# Patient Record
Sex: FEMALE | Race: WHITE | Employment: FULL TIME | URBAN - METROPOLITAN AREA
[De-identification: names, ages, dates, MRNs, and addresses within clinical notes are randomized per-mention and may not be internally consistent; named-entity substitution may affect disease eponyms.]

---

## 2018-01-20 PROBLEM — K61.2 ABSCESS OF ANAL OR RECTAL REGION: Status: ACTIVE | Noted: 2018-01-20

## 2018-02-16 PROBLEM — N20.1 URETEROLITHIASIS: Status: ACTIVE | Noted: 2018-02-16

## 2018-04-06 ENCOUNTER — HOSPITAL ENCOUNTER (OUTPATIENT)
Age: 63
Discharge: HOME | End: 2018-04-08
Payer: COMMERCIAL

## 2018-04-06 PROCEDURE — 88305 TISSUE EXAM BY PATHOLOGIST: CPT

## 2018-04-13 ENCOUNTER — HOSPITAL ENCOUNTER (OUTPATIENT)
Age: 63
Discharge: HOME | End: 2018-04-15
Payer: COMMERCIAL

## 2018-04-13 ENCOUNTER — HOSPITAL ENCOUNTER (OUTPATIENT)
Dept: GENERAL RADIOLOGY | Age: 63
Discharge: HOME | End: 2018-04-15
Payer: COMMERCIAL

## 2018-04-13 DIAGNOSIS — Z01.811 PRE-OP CHEST EXAM: ICD-10-CM

## 2018-04-13 PROCEDURE — 71046 X-RAY EXAM CHEST 2 VIEWS: CPT

## 2018-04-14 ENCOUNTER — HOSPITAL ENCOUNTER (OUTPATIENT)
Age: 63
Discharge: HOME | End: 2018-04-14
Payer: COMMERCIAL

## 2018-04-14 LAB
ALBUMIN SERPL-MCNC: 3.6 G/DL (ref 3.5–5.2)
ALP BLD-CCNC: 79 U/L (ref 35–104)
ALT SERPL-CCNC: 12 U/L (ref 0–32)
ANION GAP SERPL CALCULATED.3IONS-SCNC: 12 MMOL/L (ref 7–16)
AST SERPL-CCNC: 13 U/L (ref 0–31)
BILIRUB SERPL-MCNC: 0.4 MG/DL (ref 0–1.2)
BUN BLDV-MCNC: 15 MG/DL (ref 8–23)
CALCIUM SERPL-MCNC: 10.5 MG/DL (ref 8.6–10.2)
CHLORIDE BLD-SCNC: 101 MMOL/L (ref 98–107)
CHOLESTEROL, TOTAL: 146 MG/DL (ref 0–199)
CO2: 25 MMOL/L (ref 22–29)
CREAT SERPL-MCNC: 1.1 MG/DL (ref 0.5–1)
FERRITIN: 34 NG/ML
GFR AFRICAN AMERICAN: >60
GFR NON-AFRICAN AMERICAN: 50 ML/MIN/1.73
GLUCOSE BLD-MCNC: 119 MG/DL (ref 74–109)
HBA1C MFR BLD: 6 % (ref 4.8–5.9)
HCT VFR BLD CALC: 33.5 % (ref 34–48)
HDLC SERPL-MCNC: 43 MG/DL
HEMOGLOBIN: 10.3 G/DL (ref 11.5–15.5)
IRON SATURATION: 19 % (ref 15–50)
IRON: 50 MCG/DL (ref 37–145)
LDL CHOLESTEROL CALCULATED: 63 MG/DL (ref 0–99)
MCH RBC QN AUTO: 27.2 PG (ref 26–35)
MCHC RBC AUTO-ENTMCNC: 30.7 % (ref 32–34.5)
MCV RBC AUTO: 88.6 FL (ref 80–99.9)
PDW BLD-RTO: 15.2 FL (ref 11.5–15)
PLATELET # BLD: 377 E9/L (ref 130–450)
PMV BLD AUTO: 11.4 FL (ref 7–12)
POTASSIUM SERPL-SCNC: 4.1 MMOL/L (ref 3.5–5)
RBC # BLD: 3.78 E12/L (ref 3.5–5.5)
SODIUM BLD-SCNC: 138 MMOL/L (ref 132–146)
TOTAL IRON BINDING CAPACITY: 258 MCG/DL (ref 250–450)
TOTAL PROTEIN: 7.6 G/DL (ref 6.4–8.3)
TRIGL SERPL-MCNC: 201 MG/DL (ref 0–149)
TSH SERPL DL<=0.05 MIU/L-ACNC: 2.5 UIU/ML (ref 0.27–4.2)
VLDLC SERPL CALC-MCNC: 40 MG/DL
WBC # BLD: 8.4 E9/L (ref 4.5–11.5)

## 2018-04-14 PROCEDURE — 80061 LIPID PANEL: CPT

## 2018-04-14 PROCEDURE — 82728 ASSAY OF FERRITIN: CPT

## 2018-04-14 PROCEDURE — 83036 HEMOGLOBIN GLYCOSYLATED A1C: CPT

## 2018-04-14 PROCEDURE — 83550 IRON BINDING TEST: CPT

## 2018-04-14 PROCEDURE — 36415 COLL VENOUS BLD VENIPUNCTURE: CPT

## 2018-04-14 PROCEDURE — 80053 COMPREHEN METABOLIC PANEL: CPT

## 2018-04-14 PROCEDURE — 83540 ASSAY OF IRON: CPT

## 2018-04-14 PROCEDURE — 85027 COMPLETE CBC AUTOMATED: CPT

## 2018-04-14 PROCEDURE — 87340 HEPATITIS B SURFACE AG IA: CPT

## 2018-04-14 PROCEDURE — 84443 ASSAY THYROID STIM HORMONE: CPT

## 2018-04-14 PROCEDURE — 86706 HEP B SURFACE ANTIBODY: CPT

## 2018-04-16 LAB
HBV SURFACE AB TITR SER: REACTIVE {TITER}
HEPATITIS B SURFACE ANTIGEN INTERPRETATION: NORMAL

## 2018-08-24 ENCOUNTER — HOSPITAL ENCOUNTER (OUTPATIENT)
Age: 63
Discharge: HOME | End: 2018-08-24
Payer: COMMERCIAL

## 2018-08-24 LAB
ALBUMIN SERPL-MCNC: 3.5 G/DL (ref 3.5–5.2)
ALP BLD-CCNC: 79 U/L (ref 35–104)
ALT SERPL-CCNC: 13 U/L (ref 0–32)
ANION GAP SERPL CALCULATED.3IONS-SCNC: 9 MMOL/L (ref 7–16)
AST SERPL-CCNC: 25 U/L (ref 0–31)
BASOPHILS ABSOLUTE: 0.03 E9/L (ref 0–0.2)
BASOPHILS RELATIVE PERCENT: 0.4 % (ref 0–2)
BILIRUB SERPL-MCNC: 0.3 MG/DL (ref 0–1.2)
BUN BLDV-MCNC: 13 MG/DL (ref 8–23)
C-REACTIVE PROTEIN: 3.2 MG/DL (ref 0–0.4)
CALCIUM SERPL-MCNC: 10.6 MG/DL (ref 8.6–10.2)
CHLORIDE BLD-SCNC: 102 MMOL/L (ref 98–107)
CO2: 28 MMOL/L (ref 22–29)
CREAT SERPL-MCNC: 1.1 MG/DL (ref 0.5–1)
EOSINOPHILS ABSOLUTE: 0.32 E9/L (ref 0.05–0.5)
EOSINOPHILS RELATIVE PERCENT: 4.2 % (ref 0–6)
GFR AFRICAN AMERICAN: >60
GFR NON-AFRICAN AMERICAN: 50 ML/MIN/1.73
GLUCOSE BLD-MCNC: 109 MG/DL (ref 74–109)
HCT VFR BLD CALC: 31.4 % (ref 34–48)
HEMOGLOBIN: 9.5 G/DL (ref 11.5–15.5)
IMMATURE GRANULOCYTES #: 0.03 E9/L
IMMATURE GRANULOCYTES %: 0.4 % (ref 0–5)
LYMPHOCYTES ABSOLUTE: 1.84 E9/L (ref 1.5–4)
LYMPHOCYTES RELATIVE PERCENT: 24 % (ref 20–42)
MCH RBC QN AUTO: 26 PG (ref 26–35)
MCHC RBC AUTO-ENTMCNC: 30.3 % (ref 32–34.5)
MCV RBC AUTO: 86 FL (ref 80–99.9)
MONOCYTES ABSOLUTE: 0.41 E9/L (ref 0.1–0.95)
MONOCYTES RELATIVE PERCENT: 5.3 % (ref 2–12)
NEUTROPHILS ABSOLUTE: 5.05 E9/L (ref 1.8–7.3)
NEUTROPHILS RELATIVE PERCENT: 65.7 % (ref 43–80)
PDW BLD-RTO: 15.4 FL (ref 11.5–15)
PLATELET # BLD: 407 E9/L (ref 130–450)
PMV BLD AUTO: 10 FL (ref 7–12)
POTASSIUM SERPL-SCNC: 5.1 MMOL/L (ref 3.5–5)
RBC # BLD: 3.65 E12/L (ref 3.5–5.5)
SODIUM BLD-SCNC: 139 MMOL/L (ref 132–146)
TOTAL PROTEIN: 7.9 G/DL (ref 6.4–8.3)
VITAMIN B-12: 371 PG/ML (ref 211–946)
VITAMIN D 25-HYDROXY: 25 NG/ML (ref 30–100)
WBC # BLD: 7.7 E9/L (ref 4.5–11.5)

## 2018-08-24 PROCEDURE — 80053 COMPREHEN METABOLIC PANEL: CPT

## 2018-08-24 PROCEDURE — 82607 VITAMIN B-12: CPT

## 2018-08-24 PROCEDURE — 36415 COLL VENOUS BLD VENIPUNCTURE: CPT

## 2018-08-24 PROCEDURE — 85025 COMPLETE CBC W/AUTO DIFF WBC: CPT

## 2018-08-24 PROCEDURE — 80299 QUANTITATIVE ASSAY DRUG: CPT

## 2018-08-24 PROCEDURE — 83993 ASSAY FOR CALPROTECTIN FECAL: CPT

## 2018-08-24 PROCEDURE — 86140 C-REACTIVE PROTEIN: CPT

## 2018-08-24 PROCEDURE — 82306 VITAMIN D 25 HYDROXY: CPT

## 2018-09-02 LAB
Lab: NORMAL
Lab: NORMAL
REPORT: NORMAL
REPORT: NORMAL
THIS TEST SENT TO: NORMAL
THIS TEST SENT TO: NORMAL

## 2018-12-08 ENCOUNTER — HOSPITAL ENCOUNTER (OUTPATIENT)
Age: 63
Discharge: HOME | End: 2018-12-08
Payer: COMMERCIAL

## 2018-12-08 LAB
ALBUMIN SERPL-MCNC: 3.5 G/DL (ref 3.5–5.2)
ALP BLD-CCNC: 76 U/L (ref 35–104)
ALT SERPL-CCNC: 9 U/L (ref 0–32)
AST SERPL-CCNC: 12 U/L (ref 0–31)
BILIRUB SERPL-MCNC: 0.4 MG/DL (ref 0–1.2)
BILIRUBIN DIRECT: <0.2 MG/DL (ref 0–0.3)
BILIRUBIN, INDIRECT: NORMAL MG/DL (ref 0–1)
HCT VFR BLD CALC: 30.1 % (ref 34–48)
HEMOGLOBIN: 9.5 G/DL (ref 11.5–15.5)
MCH RBC QN AUTO: 27.3 PG (ref 26–35)
MCHC RBC AUTO-ENTMCNC: 31.6 % (ref 32–34.5)
MCV RBC AUTO: 86.5 FL (ref 80–99.9)
PDW BLD-RTO: 14.5 FL (ref 11.5–15)
PLATELET # BLD: 386 E9/L (ref 130–450)
PMV BLD AUTO: 9.3 FL (ref 7–12)
RBC # BLD: 3.48 E12/L (ref 3.5–5.5)
SEDIMENTATION RATE, ERYTHROCYTE: 109 MM/HR (ref 0–20)
TOTAL PROTEIN: 7.7 G/DL (ref 6.4–8.3)
WBC # BLD: 9.2 E9/L (ref 4.5–11.5)

## 2018-12-08 PROCEDURE — 80076 HEPATIC FUNCTION PANEL: CPT

## 2018-12-08 PROCEDURE — 85027 COMPLETE CBC AUTOMATED: CPT

## 2018-12-08 PROCEDURE — 36415 COLL VENOUS BLD VENIPUNCTURE: CPT

## 2018-12-08 PROCEDURE — 85651 RBC SED RATE NONAUTOMATED: CPT

## 2018-12-08 PROCEDURE — 86140 C-REACTIVE PROTEIN: CPT

## 2018-12-09 LAB — C-REACTIVE PROTEIN: 4.4 MG/DL (ref 0–0.4)

## 2019-02-09 ENCOUNTER — HOSPITAL ENCOUNTER (OUTPATIENT)
Age: 64
Discharge: HOME | End: 2019-02-09
Payer: COMMERCIAL

## 2019-02-09 LAB
ALBUMIN SERPL-MCNC: 3.4 G/DL (ref 3.5–5.2)
ALP BLD-CCNC: 81 U/L (ref 35–104)
ALT SERPL-CCNC: 7 U/L (ref 0–32)
ANION GAP SERPL CALCULATED.3IONS-SCNC: 10 MMOL/L (ref 7–16)
AST SERPL-CCNC: 11 U/L (ref 0–31)
BASOPHILS ABSOLUTE: 0.03 E9/L (ref 0–0.2)
BASOPHILS RELATIVE PERCENT: 0.4 % (ref 0–2)
BILIRUB SERPL-MCNC: 0.5 MG/DL (ref 0–1.2)
BUN BLDV-MCNC: 12 MG/DL (ref 8–23)
C-REACTIVE PROTEIN: 4 MG/DL (ref 0–0.4)
CALCIUM SERPL-MCNC: 10.6 MG/DL (ref 8.6–10.2)
CHLORIDE BLD-SCNC: 102 MMOL/L (ref 98–107)
CO2: 29 MMOL/L (ref 22–29)
CREAT SERPL-MCNC: 1.2 MG/DL (ref 0.5–1)
EOSINOPHILS ABSOLUTE: 0.16 E9/L (ref 0.05–0.5)
EOSINOPHILS RELATIVE PERCENT: 1.9 % (ref 0–6)
GFR AFRICAN AMERICAN: 55
GFR NON-AFRICAN AMERICAN: 45 ML/MIN/1.73
GLUCOSE BLD-MCNC: 117 MG/DL (ref 74–99)
HCT VFR BLD CALC: 32.3 % (ref 34–48)
HEMOGLOBIN: 9.9 G/DL (ref 11.5–15.5)
IMMATURE GRANULOCYTES #: 0.05 E9/L
IMMATURE GRANULOCYTES %: 0.6 % (ref 0–5)
LYMPHOCYTES ABSOLUTE: 1.34 E9/L (ref 1.5–4)
LYMPHOCYTES RELATIVE PERCENT: 15.9 % (ref 20–42)
MCH RBC QN AUTO: 27.1 PG (ref 26–35)
MCHC RBC AUTO-ENTMCNC: 30.7 % (ref 32–34.5)
MCV RBC AUTO: 88.5 FL (ref 80–99.9)
MONOCYTES ABSOLUTE: 0.44 E9/L (ref 0.1–0.95)
MONOCYTES RELATIVE PERCENT: 5.2 % (ref 2–12)
NEUTROPHILS ABSOLUTE: 6.42 E9/L (ref 1.8–7.3)
NEUTROPHILS RELATIVE PERCENT: 76 % (ref 43–80)
PDW BLD-RTO: 14.7 FL (ref 11.5–15)
PLATELET # BLD: 496 E9/L (ref 130–450)
PMV BLD AUTO: 9.8 FL (ref 7–12)
POTASSIUM SERPL-SCNC: 4 MMOL/L (ref 3.5–5)
RBC # BLD: 3.65 E12/L (ref 3.5–5.5)
SODIUM BLD-SCNC: 141 MMOL/L (ref 132–146)
TOTAL PROTEIN: 7.4 G/DL (ref 6.4–8.3)
WBC # BLD: 8.4 E9/L (ref 4.5–11.5)

## 2019-02-09 PROCEDURE — 36415 COLL VENOUS BLD VENIPUNCTURE: CPT

## 2019-02-09 PROCEDURE — 85025 COMPLETE CBC W/AUTO DIFF WBC: CPT

## 2019-02-09 PROCEDURE — 80053 COMPREHEN METABOLIC PANEL: CPT

## 2019-02-09 PROCEDURE — 86140 C-REACTIVE PROTEIN: CPT

## 2019-02-11 ENCOUNTER — PREP FOR PROCEDURE (OUTPATIENT)
Dept: SURGERY | Age: 64
End: 2019-02-11

## 2019-02-11 RX ORDER — SODIUM CHLORIDE, SODIUM LACTATE, POTASSIUM CHLORIDE, CALCIUM CHLORIDE 600; 310; 30; 20 MG/100ML; MG/100ML; MG/100ML; MG/100ML
INJECTION, SOLUTION INTRAVENOUS CONTINUOUS
Status: CANCELLED | OUTPATIENT
Start: 2019-02-11

## 2019-02-11 RX ORDER — SODIUM CHLORIDE 0.9 % (FLUSH) 0.9 %
10 SYRINGE (ML) INJECTION EVERY 12 HOURS SCHEDULED
Status: CANCELLED | OUTPATIENT
Start: 2019-02-11

## 2019-02-12 ENCOUNTER — PREP FOR PROCEDURE (OUTPATIENT)
Dept: SURGERY | Age: 64
End: 2019-02-12

## 2019-02-13 ENCOUNTER — HOSPITAL ENCOUNTER (OUTPATIENT)
Age: 64
Setting detail: OUTPATIENT SURGERY
Discharge: HOME | End: 2019-02-13
Attending: SURGERY | Admitting: SURGERY
Payer: COMMERCIAL

## 2019-02-13 ENCOUNTER — ANESTHESIA (OUTPATIENT)
Dept: OPERATING ROOM | Age: 64
End: 2019-02-13
Payer: COMMERCIAL

## 2019-02-13 ENCOUNTER — ANESTHESIA EVENT (OUTPATIENT)
Dept: OPERATING ROOM | Age: 64
End: 2019-02-13
Payer: COMMERCIAL

## 2019-02-13 VITALS
DIASTOLIC BLOOD PRESSURE: 57 MMHG | HEART RATE: 70 BPM | OXYGEN SATURATION: 97 % | BODY MASS INDEX: 33.13 KG/M2 | SYSTOLIC BLOOD PRESSURE: 111 MMHG | RESPIRATION RATE: 16 BRPM | WEIGHT: 180 LBS | TEMPERATURE: 97.5 F | HEIGHT: 62 IN

## 2019-02-13 VITALS — OXYGEN SATURATION: 99 % | DIASTOLIC BLOOD PRESSURE: 52 MMHG | TEMPERATURE: 67.6 F | SYSTOLIC BLOOD PRESSURE: 90 MMHG

## 2019-02-13 DIAGNOSIS — K61.2 ABSCESS OF ANAL OR RECTAL REGION: Primary | ICD-10-CM

## 2019-02-13 LAB — METER GLUCOSE: 116 MG/DL (ref 74–99)

## 2019-02-13 PROCEDURE — 3700000000 HC ANESTHESIA ATTENDED CARE: Performed by: SURGERY

## 2019-02-13 PROCEDURE — 2500000003 HC RX 250 WO HCPCS: Performed by: SURGERY

## 2019-02-13 PROCEDURE — 2709999900 HC NON-CHARGEABLE SUPPLY: Performed by: SURGERY

## 2019-02-13 PROCEDURE — 3700000001 HC ADD 15 MINUTES (ANESTHESIA): Performed by: SURGERY

## 2019-02-13 PROCEDURE — 6360000002 HC RX W HCPCS: Performed by: NURSE ANESTHETIST, CERTIFIED REGISTERED

## 2019-02-13 PROCEDURE — 7100000010 HC PHASE II RECOVERY - FIRST 15 MIN: Performed by: SURGERY

## 2019-02-13 PROCEDURE — 2500000003 HC RX 250 WO HCPCS: Performed by: NURSE ANESTHETIST, CERTIFIED REGISTERED

## 2019-02-13 PROCEDURE — 7100000011 HC PHASE II RECOVERY - ADDTL 15 MIN: Performed by: SURGERY

## 2019-02-13 PROCEDURE — 3600000013 HC SURGERY LEVEL 3 ADDTL 15MIN: Performed by: SURGERY

## 2019-02-13 PROCEDURE — 3600000003 HC SURGERY LEVEL 3 BASE: Performed by: SURGERY

## 2019-02-13 PROCEDURE — 2580000003 HC RX 258: Performed by: SURGERY

## 2019-02-13 PROCEDURE — 82962 GLUCOSE BLOOD TEST: CPT

## 2019-02-13 PROCEDURE — 2580000003 HC RX 258: Performed by: NURSE ANESTHETIST, CERTIFIED REGISTERED

## 2019-02-13 RX ORDER — MEPERIDINE HYDROCHLORIDE 25 MG/ML
12.5 INJECTION INTRAMUSCULAR; INTRAVENOUS; SUBCUTANEOUS EVERY 10 MIN PRN
Status: DISCONTINUED | OUTPATIENT
Start: 2019-02-13 | End: 2019-02-13 | Stop reason: HOSPADM

## 2019-02-13 RX ORDER — LIDOCAINE HYDROCHLORIDE 20 MG/ML
INJECTION, SOLUTION INFILTRATION; PERINEURAL PRN
Status: DISCONTINUED | OUTPATIENT
Start: 2019-02-13 | End: 2019-02-13 | Stop reason: SDUPTHER

## 2019-02-13 RX ORDER — KETOROLAC TROMETHAMINE 30 MG/ML
INJECTION, SOLUTION INTRAMUSCULAR; INTRAVENOUS PRN
Status: DISCONTINUED | OUTPATIENT
Start: 2019-02-13 | End: 2019-02-13 | Stop reason: SDUPTHER

## 2019-02-13 RX ORDER — LIDOCAINE HYDROCHLORIDE 10 MG/ML
INJECTION, SOLUTION INFILTRATION; PERINEURAL PRN
Status: DISCONTINUED | OUTPATIENT
Start: 2019-02-13 | End: 2019-02-13 | Stop reason: ALTCHOICE

## 2019-02-13 RX ORDER — OXYCODONE HYDROCHLORIDE AND ACETAMINOPHEN 5; 325 MG/1; MG/1
1 TABLET ORAL
Status: DISCONTINUED | OUTPATIENT
Start: 2019-02-13 | End: 2019-02-13 | Stop reason: HOSPADM

## 2019-02-13 RX ORDER — SODIUM CHLORIDE, SODIUM LACTATE, POTASSIUM CHLORIDE, CALCIUM CHLORIDE 600; 310; 30; 20 MG/100ML; MG/100ML; MG/100ML; MG/100ML
INJECTION, SOLUTION INTRAVENOUS CONTINUOUS PRN
Status: DISCONTINUED | OUTPATIENT
Start: 2019-02-13 | End: 2019-02-13 | Stop reason: SDUPTHER

## 2019-02-13 RX ORDER — FENTANYL CITRATE 50 UG/ML
50 INJECTION, SOLUTION INTRAMUSCULAR; INTRAVENOUS EVERY 5 MIN PRN
Status: DISCONTINUED | OUTPATIENT
Start: 2019-02-13 | End: 2019-02-13 | Stop reason: HOSPADM

## 2019-02-13 RX ORDER — HYDROCODONE BITARTRATE AND ACETAMINOPHEN 5; 325 MG/1; MG/1
1 TABLET ORAL EVERY 4 HOURS PRN
Qty: 30 TABLET | Refills: 0 | Status: SHIPPED | OUTPATIENT
Start: 2019-02-13 | End: 2019-02-18

## 2019-02-13 RX ORDER — FENTANYL CITRATE 50 UG/ML
INJECTION, SOLUTION INTRAMUSCULAR; INTRAVENOUS PRN
Status: DISCONTINUED | OUTPATIENT
Start: 2019-02-13 | End: 2019-02-13 | Stop reason: SDUPTHER

## 2019-02-13 RX ORDER — MIDAZOLAM HYDROCHLORIDE 1 MG/ML
INJECTION INTRAMUSCULAR; INTRAVENOUS PRN
Status: DISCONTINUED | OUTPATIENT
Start: 2019-02-13 | End: 2019-02-13 | Stop reason: SDUPTHER

## 2019-02-13 RX ORDER — SODIUM CHLORIDE, SODIUM LACTATE, POTASSIUM CHLORIDE, CALCIUM CHLORIDE 600; 310; 30; 20 MG/100ML; MG/100ML; MG/100ML; MG/100ML
INJECTION, SOLUTION INTRAVENOUS CONTINUOUS
Status: DISCONTINUED | OUTPATIENT
Start: 2019-02-13 | End: 2019-02-13 | Stop reason: HOSPADM

## 2019-02-13 RX ORDER — SODIUM CHLORIDE 0.9 % (FLUSH) 0.9 %
10 SYRINGE (ML) INJECTION EVERY 12 HOURS SCHEDULED
Status: DISCONTINUED | OUTPATIENT
Start: 2019-02-13 | End: 2019-02-13 | Stop reason: HOSPADM

## 2019-02-13 RX ORDER — PROPOFOL 10 MG/ML
INJECTION, EMULSION INTRAVENOUS CONTINUOUS PRN
Status: DISCONTINUED | OUTPATIENT
Start: 2019-02-13 | End: 2019-02-13 | Stop reason: SDUPTHER

## 2019-02-13 RX ORDER — PROMETHAZINE HYDROCHLORIDE 25 MG/ML
6.25 INJECTION, SOLUTION INTRAMUSCULAR; INTRAVENOUS
Status: DISCONTINUED | OUTPATIENT
Start: 2019-02-13 | End: 2019-02-13 | Stop reason: HOSPADM

## 2019-02-13 RX ORDER — DEXAMETHASONE SODIUM PHOSPHATE 4 MG/ML
INJECTION, SOLUTION INTRA-ARTICULAR; INTRALESIONAL; INTRAMUSCULAR; INTRAVENOUS; SOFT TISSUE PRN
Status: DISCONTINUED | OUTPATIENT
Start: 2019-02-13 | End: 2019-02-13 | Stop reason: SDUPTHER

## 2019-02-13 RX ORDER — ONDANSETRON 2 MG/ML
INJECTION INTRAMUSCULAR; INTRAVENOUS PRN
Status: DISCONTINUED | OUTPATIENT
Start: 2019-02-13 | End: 2019-02-13 | Stop reason: SDUPTHER

## 2019-02-13 RX ADMIN — KETOROLAC TROMETHAMINE 30 MG: 30 INJECTION, SOLUTION INTRAMUSCULAR; INTRAVENOUS at 11:45

## 2019-02-13 RX ADMIN — DEXAMETHASONE SODIUM PHOSPHATE 10 MG: 4 INJECTION, SOLUTION INTRAMUSCULAR; INTRAVENOUS at 11:32

## 2019-02-13 RX ADMIN — LIDOCAINE HYDROCHLORIDE 100 MG: 20 INJECTION, SOLUTION INFILTRATION; PERINEURAL at 11:31

## 2019-02-13 RX ADMIN — ONDANSETRON HYDROCHLORIDE 4 MG: 2 INJECTION, SOLUTION INTRAMUSCULAR; INTRAVENOUS at 11:32

## 2019-02-13 RX ADMIN — MIDAZOLAM HYDROCHLORIDE 2 MG: 1 INJECTION, SOLUTION INTRAMUSCULAR; INTRAVENOUS at 11:28

## 2019-02-13 RX ADMIN — SODIUM CHLORIDE, POTASSIUM CHLORIDE, SODIUM LACTATE AND CALCIUM CHLORIDE: 600; 310; 30; 20 INJECTION, SOLUTION INTRAVENOUS at 10:37

## 2019-02-13 RX ADMIN — SODIUM CHLORIDE, POTASSIUM CHLORIDE, SODIUM LACTATE AND CALCIUM CHLORIDE: 600; 310; 30; 20 INJECTION, SOLUTION INTRAVENOUS at 11:28

## 2019-02-13 RX ADMIN — PROPOFOL 140 MCG/KG/MIN: 10 INJECTION, EMULSION INTRAVENOUS at 11:31

## 2019-02-13 RX ADMIN — FENTANYL CITRATE 100 MCG: 50 INJECTION, SOLUTION INTRAMUSCULAR; INTRAVENOUS at 11:31

## 2019-02-13 ASSESSMENT — PULMONARY FUNCTION TESTS
PIF_VALUE: 1

## 2019-02-13 ASSESSMENT — PAIN DESCRIPTION - PAIN TYPE
TYPE: SURGICAL PAIN
TYPE: SURGICAL PAIN

## 2019-02-13 ASSESSMENT — PAIN - FUNCTIONAL ASSESSMENT: PAIN_FUNCTIONAL_ASSESSMENT: 0-10

## 2019-02-13 ASSESSMENT — PAIN DESCRIPTION - LOCATION
LOCATION: RECTUM
LOCATION: RECTUM

## 2019-02-13 ASSESSMENT — LIFESTYLE VARIABLES: SMOKING_STATUS: 0

## 2019-02-13 ASSESSMENT — PAIN SCALES - GENERAL
PAINLEVEL_OUTOF10: 3
PAINLEVEL_OUTOF10: 2

## 2019-03-14 ENCOUNTER — HOSPITAL ENCOUNTER (OUTPATIENT)
Dept: GENERAL RADIOLOGY | Age: 64
Discharge: HOME | End: 2019-03-16
Payer: COMMERCIAL

## 2019-03-14 ENCOUNTER — HOSPITAL ENCOUNTER (OUTPATIENT)
Age: 64
Discharge: HOME | End: 2019-03-16
Payer: COMMERCIAL

## 2019-03-14 DIAGNOSIS — M79.672 LEFT FOOT PAIN: ICD-10-CM

## 2019-03-14 PROCEDURE — 73630 X-RAY EXAM OF FOOT: CPT

## 2019-03-14 PROCEDURE — 73610 X-RAY EXAM OF ANKLE: CPT

## 2019-03-15 ENCOUNTER — HOSPITAL ENCOUNTER (OUTPATIENT)
Dept: ULTRASOUND IMAGING | Age: 64
Discharge: HOME | End: 2019-03-17
Payer: COMMERCIAL

## 2019-03-15 DIAGNOSIS — R60.0 LOCALIZED EDEMA: ICD-10-CM

## 2019-03-15 PROCEDURE — 93971 EXTREMITY STUDY: CPT

## 2019-05-11 ENCOUNTER — HOSPITAL ENCOUNTER (OUTPATIENT)
Age: 64
Discharge: HOME | End: 2019-05-11
Payer: COMMERCIAL

## 2019-05-11 LAB
C-REACTIVE PROTEIN: 1.7 MG/DL (ref 0–0.4)
SEDIMENTATION RATE, ERYTHROCYTE: 80 MM/HR (ref 0–20)

## 2019-05-11 PROCEDURE — 83993 ASSAY FOR CALPROTECTIN FECAL: CPT

## 2019-05-11 PROCEDURE — 36415 COLL VENOUS BLD VENIPUNCTURE: CPT

## 2019-05-11 PROCEDURE — 86140 C-REACTIVE PROTEIN: CPT

## 2019-05-11 PROCEDURE — 85651 RBC SED RATE NONAUTOMATED: CPT

## 2019-05-17 LAB
Lab: NORMAL
REPORT: NORMAL
THIS TEST SENT TO: NORMAL

## 2019-10-19 ENCOUNTER — HOSPITAL ENCOUNTER (OUTPATIENT)
Age: 64
Discharge: HOME | End: 2019-10-19
Payer: COMMERCIAL

## 2019-10-19 LAB
ALBUMIN SERPL-MCNC: 4 G/DL (ref 3.5–5.2)
ALP BLD-CCNC: 99 U/L (ref 35–104)
ALT SERPL-CCNC: 17 U/L (ref 0–32)
ANION GAP SERPL CALCULATED.3IONS-SCNC: 9 MMOL/L (ref 7–16)
AST SERPL-CCNC: 15 U/L (ref 0–31)
BASOPHILS ABSOLUTE: 0.03 E9/L (ref 0–0.2)
BASOPHILS RELATIVE PERCENT: 0.4 % (ref 0–2)
BILIRUB SERPL-MCNC: 0.5 MG/DL (ref 0–1.2)
BUN BLDV-MCNC: 16 MG/DL (ref 8–23)
C-REACTIVE PROTEIN: 1.6 MG/DL (ref 0–0.4)
CALCIUM SERPL-MCNC: 10.5 MG/DL (ref 8.6–10.2)
CHLORIDE BLD-SCNC: 104 MMOL/L (ref 98–107)
CHOLESTEROL, TOTAL: 140 MG/DL (ref 0–199)
CO2: 28 MMOL/L (ref 22–29)
CREAT SERPL-MCNC: 1.2 MG/DL (ref 0.5–1)
EOSINOPHILS ABSOLUTE: 0.27 E9/L (ref 0.05–0.5)
EOSINOPHILS RELATIVE PERCENT: 3.8 % (ref 0–6)
FERRITIN: 32 NG/ML
GFR AFRICAN AMERICAN: 55
GFR NON-AFRICAN AMERICAN: 45 ML/MIN/1.73
GLUCOSE BLD-MCNC: 98 MG/DL (ref 74–99)
HBA1C MFR BLD: 6.4 % (ref 4–5.6)
HCT VFR BLD CALC: 36.4 % (ref 34–48)
HDLC SERPL-MCNC: 52 MG/DL
HEMOGLOBIN: 11.2 G/DL (ref 11.5–15.5)
IMMATURE GRANULOCYTES #: 0.02 E9/L
IMMATURE GRANULOCYTES %: 0.3 % (ref 0–5)
IRON SATURATION: 17 % (ref 15–50)
IRON: 43 MCG/DL (ref 37–145)
LDL CHOLESTEROL CALCULATED: 63 MG/DL (ref 0–99)
LYMPHOCYTES ABSOLUTE: 1.31 E9/L (ref 1.5–4)
LYMPHOCYTES RELATIVE PERCENT: 18.7 % (ref 20–42)
MCH RBC QN AUTO: 27.7 PG (ref 26–35)
MCHC RBC AUTO-ENTMCNC: 30.8 % (ref 32–34.5)
MCV RBC AUTO: 90.1 FL (ref 80–99.9)
MONOCYTES ABSOLUTE: 0.28 E9/L (ref 0.1–0.95)
MONOCYTES RELATIVE PERCENT: 4 % (ref 2–12)
NEUTROPHILS ABSOLUTE: 5.11 E9/L (ref 1.8–7.3)
NEUTROPHILS RELATIVE PERCENT: 72.8 % (ref 43–80)
PDW BLD-RTO: 14 FL (ref 11.5–15)
PLATELET # BLD: 352 E9/L (ref 130–450)
PMV BLD AUTO: 10.8 FL (ref 7–12)
POTASSIUM SERPL-SCNC: 4.1 MMOL/L (ref 3.5–5)
RBC # BLD: 4.04 E12/L (ref 3.5–5.5)
SODIUM BLD-SCNC: 141 MMOL/L (ref 132–146)
TOTAL IRON BINDING CAPACITY: 259 MCG/DL (ref 250–450)
TOTAL PROTEIN: 8 G/DL (ref 6.4–8.3)
TRIGL SERPL-MCNC: 125 MG/DL (ref 0–149)
TSH SERPL DL<=0.05 MIU/L-ACNC: 2.71 UIU/ML (ref 0.27–4.2)
VLDLC SERPL CALC-MCNC: 25 MG/DL
WBC # BLD: 7 E9/L (ref 4.5–11.5)

## 2019-10-19 PROCEDURE — 82728 ASSAY OF FERRITIN: CPT

## 2019-10-19 PROCEDURE — 83540 ASSAY OF IRON: CPT

## 2019-10-19 PROCEDURE — 36415 COLL VENOUS BLD VENIPUNCTURE: CPT

## 2019-10-19 PROCEDURE — 80061 LIPID PANEL: CPT

## 2019-10-19 PROCEDURE — 83550 IRON BINDING TEST: CPT

## 2019-10-19 PROCEDURE — 83036 HEMOGLOBIN GLYCOSYLATED A1C: CPT

## 2019-10-19 PROCEDURE — 86140 C-REACTIVE PROTEIN: CPT

## 2019-10-19 PROCEDURE — 84443 ASSAY THYROID STIM HORMONE: CPT

## 2019-10-19 PROCEDURE — 85025 COMPLETE CBC W/AUTO DIFF WBC: CPT

## 2019-10-19 PROCEDURE — 80053 COMPREHEN METABOLIC PANEL: CPT

## 2020-07-25 ENCOUNTER — HOSPITAL ENCOUNTER (OUTPATIENT)
Age: 65
Discharge: HOME | End: 2020-07-25
Payer: COMMERCIAL

## 2020-07-25 LAB
BASOPHILS ABSOLUTE: 0.03 E9/L (ref 0–0.2)
BASOPHILS RELATIVE PERCENT: 0.4 % (ref 0–2)
EOSINOPHILS ABSOLUTE: 0.24 E9/L (ref 0.05–0.5)
EOSINOPHILS RELATIVE PERCENT: 3.1 % (ref 0–6)
HCT VFR BLD CALC: 37 % (ref 34–48)
HEMOGLOBIN: 11.7 G/DL (ref 11.5–15.5)
IMMATURE GRANULOCYTES #: 0.04 E9/L
IMMATURE GRANULOCYTES %: 0.5 % (ref 0–5)
LYMPHOCYTES ABSOLUTE: 1.77 E9/L (ref 1.5–4)
LYMPHOCYTES RELATIVE PERCENT: 23 % (ref 20–42)
MCH RBC QN AUTO: 27.9 PG (ref 26–35)
MCHC RBC AUTO-ENTMCNC: 31.6 % (ref 32–34.5)
MCV RBC AUTO: 88.3 FL (ref 80–99.9)
MONOCYTES ABSOLUTE: 0.39 E9/L (ref 0.1–0.95)
MONOCYTES RELATIVE PERCENT: 5.1 % (ref 2–12)
NEUTROPHILS ABSOLUTE: 5.21 E9/L (ref 1.8–7.3)
NEUTROPHILS RELATIVE PERCENT: 67.9 % (ref 43–80)
PDW BLD-RTO: 13.8 FL (ref 11.5–15)
PLATELET # BLD: 350 E9/L (ref 130–450)
PMV BLD AUTO: 10.2 FL (ref 7–12)
RBC # BLD: 4.19 E12/L (ref 3.5–5.5)
WBC # BLD: 7.7 E9/L (ref 4.5–11.5)

## 2020-07-25 PROCEDURE — 85025 COMPLETE CBC W/AUTO DIFF WBC: CPT

## 2020-07-25 PROCEDURE — 36415 COLL VENOUS BLD VENIPUNCTURE: CPT

## 2020-10-16 ENCOUNTER — HOSPITAL ENCOUNTER (OUTPATIENT)
Age: 65
Discharge: HOME | End: 2020-10-16
Payer: COMMERCIAL

## 2020-10-16 LAB
ALBUMIN SERPL-MCNC: 3.9 G/DL (ref 3.5–5.2)
ALP BLD-CCNC: 118 U/L (ref 35–104)
ALT SERPL-CCNC: 21 U/L (ref 0–32)
ANION GAP SERPL CALCULATED.3IONS-SCNC: 8 MMOL/L (ref 7–16)
AST SERPL-CCNC: 18 U/L (ref 0–31)
BASOPHILS ABSOLUTE: 0.02 E9/L (ref 0–0.2)
BASOPHILS RELATIVE PERCENT: 0.2 % (ref 0–2)
BILIRUB SERPL-MCNC: 0.6 MG/DL (ref 0–1.2)
BUN BLDV-MCNC: 15 MG/DL (ref 8–23)
C-REACTIVE PROTEIN: 1.5 MG/DL (ref 0–0.4)
CALCIUM SERPL-MCNC: 10.9 MG/DL (ref 8.6–10.2)
CHLORIDE BLD-SCNC: 102 MMOL/L (ref 98–107)
CO2: 27 MMOL/L (ref 22–29)
CREAT SERPL-MCNC: 1.1 MG/DL (ref 0.5–1)
EOSINOPHILS ABSOLUTE: 0.29 E9/L (ref 0.05–0.5)
EOSINOPHILS RELATIVE PERCENT: 3.5 % (ref 0–6)
GFR AFRICAN AMERICAN: >60
GFR NON-AFRICAN AMERICAN: 50 ML/MIN/1.73
GLUCOSE BLD-MCNC: 143 MG/DL (ref 74–99)
HCT VFR BLD CALC: 39.1 % (ref 34–48)
HEMOGLOBIN: 12.1 G/DL (ref 11.5–15.5)
IMMATURE GRANULOCYTES #: 0.03 E9/L
IMMATURE GRANULOCYTES %: 0.4 % (ref 0–5)
LYMPHOCYTES ABSOLUTE: 1.37 E9/L (ref 1.5–4)
LYMPHOCYTES RELATIVE PERCENT: 16.5 % (ref 20–42)
MCH RBC QN AUTO: 28.1 PG (ref 26–35)
MCHC RBC AUTO-ENTMCNC: 30.9 % (ref 32–34.5)
MCV RBC AUTO: 90.9 FL (ref 80–99.9)
MONOCYTES ABSOLUTE: 0.39 E9/L (ref 0.1–0.95)
MONOCYTES RELATIVE PERCENT: 4.7 % (ref 2–12)
NEUTROPHILS ABSOLUTE: 6.18 E9/L (ref 1.8–7.3)
NEUTROPHILS RELATIVE PERCENT: 74.7 % (ref 43–80)
PDW BLD-RTO: 13.5 FL (ref 11.5–15)
PLATELET # BLD: 337 E9/L (ref 130–450)
PMV BLD AUTO: 10.3 FL (ref 7–12)
POTASSIUM SERPL-SCNC: 4.3 MMOL/L (ref 3.5–5)
RBC # BLD: 4.3 E12/L (ref 3.5–5.5)
SEDIMENTATION RATE, ERYTHROCYTE: 59 MM/HR (ref 0–20)
SODIUM BLD-SCNC: 137 MMOL/L (ref 132–146)
TOTAL PROTEIN: 8.2 G/DL (ref 6.4–8.3)
WBC # BLD: 8.3 E9/L (ref 4.5–11.5)

## 2020-10-16 PROCEDURE — 36415 COLL VENOUS BLD VENIPUNCTURE: CPT

## 2020-10-16 PROCEDURE — 86140 C-REACTIVE PROTEIN: CPT

## 2020-10-16 PROCEDURE — 85651 RBC SED RATE NONAUTOMATED: CPT

## 2020-10-16 PROCEDURE — 80053 COMPREHEN METABOLIC PANEL: CPT

## 2020-10-16 PROCEDURE — 85025 COMPLETE CBC W/AUTO DIFF WBC: CPT

## 2020-12-30 ENCOUNTER — HOSPITAL ENCOUNTER (OUTPATIENT)
Age: 65
Discharge: HOME | End: 2021-01-01

## 2020-12-30 PROCEDURE — 88305 TISSUE EXAM BY PATHOLOGIST: CPT

## 2021-08-13 ENCOUNTER — HOSPITAL ENCOUNTER (OUTPATIENT)
Dept: CT IMAGING | Age: 66
Discharge: HOME | End: 2021-08-15
Payer: COMMERCIAL

## 2021-08-13 DIAGNOSIS — R10.9 ABDOMINAL PAIN, UNSPECIFIED ABDOMINAL LOCATION: ICD-10-CM

## 2021-08-13 DIAGNOSIS — K50.90 CROHN'S DISEASE WITHOUT COMPLICATION, UNSPECIFIED GASTROINTESTINAL TRACT LOCATION (HCC): ICD-10-CM

## 2021-08-13 DIAGNOSIS — E11.65 UNCONTROLLED TYPE 2 DIABETES MELLITUS WITH HYPERGLYCEMIA (HCC): ICD-10-CM

## 2021-08-13 PROCEDURE — 6360000004 HC RX CONTRAST MEDICATION: Performed by: RADIOLOGY

## 2021-08-13 PROCEDURE — 74177 CT ABD & PELVIS W/CONTRAST: CPT

## 2021-08-13 RX ADMIN — IOHEXOL 50 ML: 240 INJECTION, SOLUTION INTRATHECAL; INTRAVASCULAR; INTRAVENOUS; ORAL at 14:02

## 2021-08-13 RX ADMIN — IOPAMIDOL 75 ML: 755 INJECTION, SOLUTION INTRAVENOUS at 14:02

## 2022-03-17 ENCOUNTER — HOSPITAL ENCOUNTER (OUTPATIENT)
Dept: NUCLEAR MEDICINE | Age: 67
Discharge: HOME | End: 2022-03-17
Payer: MEDICARE

## 2022-03-17 DIAGNOSIS — E21.5 PARATHYROID DISEASE (HCC): ICD-10-CM

## 2022-03-17 PROCEDURE — 3430000000 HC RX DIAGNOSTIC RADIOPHARMACEUTICAL: Performed by: RADIOLOGY

## 2022-03-17 PROCEDURE — 78070 PARATHYROID PLANAR IMAGING: CPT

## 2022-03-17 PROCEDURE — A9500 TC99M SESTAMIBI: HCPCS | Performed by: RADIOLOGY

## 2022-03-17 RX ADMIN — Medication 25 MILLICURIE: at 10:41

## 2022-07-29 ENCOUNTER — HOSPITAL ENCOUNTER (INPATIENT)
Age: 67
LOS: 4 days | Discharge: HOME | DRG: 638 | End: 2022-08-02
Attending: EMERGENCY MEDICINE | Admitting: INTERNAL MEDICINE
Payer: MEDICARE

## 2022-07-29 ENCOUNTER — APPOINTMENT (OUTPATIENT)
Dept: GENERAL RADIOLOGY | Age: 67
DRG: 638 | End: 2022-07-29
Payer: MEDICARE

## 2022-07-29 DIAGNOSIS — L03.116 CELLULITIS OF LEFT ANKLE: Primary | ICD-10-CM

## 2022-07-29 PROBLEM — L03.90 CELLULITIS: Status: ACTIVE | Noted: 2022-07-29

## 2022-07-29 LAB
ALBUMIN SERPL-MCNC: 3.5 G/DL (ref 3.5–5.2)
ALP BLD-CCNC: 93 U/L (ref 35–104)
ALT SERPL-CCNC: 16 U/L (ref 0–32)
ANION GAP SERPL CALCULATED.3IONS-SCNC: 15 MMOL/L (ref 7–16)
AST SERPL-CCNC: 14 U/L (ref 0–31)
BASOPHILS ABSOLUTE: 0.03 E9/L (ref 0–0.2)
BASOPHILS RELATIVE PERCENT: 0.2 % (ref 0–2)
BILIRUB SERPL-MCNC: 0.2 MG/DL (ref 0–1.2)
BILIRUBIN DIRECT: <0.2 MG/DL (ref 0–0.3)
BILIRUBIN, INDIRECT: NORMAL MG/DL (ref 0–1)
BUN BLDV-MCNC: 27 MG/DL (ref 6–23)
C-REACTIVE PROTEIN: 12.1 MG/DL (ref 0–0.4)
CALCIUM SERPL-MCNC: 10.9 MG/DL (ref 8.6–10.2)
CHLORIDE BLD-SCNC: 100 MMOL/L (ref 98–107)
CO2: 19 MMOL/L (ref 22–29)
CREAT SERPL-MCNC: 1.7 MG/DL (ref 0.5–1)
EOSINOPHILS ABSOLUTE: 0.22 E9/L (ref 0.05–0.5)
EOSINOPHILS RELATIVE PERCENT: 1.7 % (ref 0–6)
GFR AFRICAN AMERICAN: 36
GFR NON-AFRICAN AMERICAN: 30 ML/MIN/1.73
GLUCOSE BLD-MCNC: 199 MG/DL (ref 74–99)
HCT VFR BLD CALC: 29.6 % (ref 34–48)
HEMOGLOBIN: 9.2 G/DL (ref 11.5–15.5)
IMMATURE GRANULOCYTES #: 0.07 E9/L
IMMATURE GRANULOCYTES %: 0.6 % (ref 0–5)
LACTIC ACID, SEPSIS: 1.3 MMOL/L (ref 0.5–1.9)
LYMPHOCYTES ABSOLUTE: 1.24 E9/L (ref 1.5–4)
LYMPHOCYTES RELATIVE PERCENT: 9.8 % (ref 20–42)
MCH RBC QN AUTO: 27.6 PG (ref 26–35)
MCHC RBC AUTO-ENTMCNC: 31.1 % (ref 32–34.5)
MCV RBC AUTO: 88.9 FL (ref 80–99.9)
METER GLUCOSE: 143 MG/DL (ref 74–99)
METER GLUCOSE: 259 MG/DL (ref 74–99)
MONOCYTES ABSOLUTE: 0.58 E9/L (ref 0.1–0.95)
MONOCYTES RELATIVE PERCENT: 4.6 % (ref 2–12)
NEUTROPHILS ABSOLUTE: 10.52 E9/L (ref 1.8–7.3)
NEUTROPHILS RELATIVE PERCENT: 83.1 % (ref 43–80)
PDW BLD-RTO: 14 FL (ref 11.5–15)
PLATELET # BLD: 440 E9/L (ref 130–450)
PMV BLD AUTO: 9.6 FL (ref 7–12)
POTASSIUM SERPL-SCNC: 3.9 MMOL/L (ref 3.5–5)
PROCALCITONIN: 0.11 NG/ML (ref 0–0.08)
RBC # BLD: 3.33 E12/L (ref 3.5–5.5)
SEDIMENTATION RATE, ERYTHROCYTE: 124 MM/HR (ref 0–20)
SODIUM BLD-SCNC: 134 MMOL/L (ref 132–146)
TOTAL PROTEIN: 7.7 G/DL (ref 6.4–8.3)
WBC # BLD: 12.7 E9/L (ref 4.5–11.5)

## 2022-07-29 PROCEDURE — 96375 TX/PRO/DX INJ NEW DRUG ADDON: CPT

## 2022-07-29 PROCEDURE — 2580000003 HC RX 258: Performed by: NURSE PRACTITIONER

## 2022-07-29 PROCEDURE — 83605 ASSAY OF LACTIC ACID: CPT

## 2022-07-29 PROCEDURE — 96374 THER/PROPH/DIAG INJ IV PUSH: CPT

## 2022-07-29 PROCEDURE — 73610 X-RAY EXAM OF ANKLE: CPT

## 2022-07-29 PROCEDURE — 85651 RBC SED RATE NONAUTOMATED: CPT

## 2022-07-29 PROCEDURE — 1200000000 HC SEMI PRIVATE

## 2022-07-29 PROCEDURE — 99285 EMERGENCY DEPT VISIT HI MDM: CPT

## 2022-07-29 PROCEDURE — 84145 PROCALCITONIN (PCT): CPT

## 2022-07-29 PROCEDURE — 85025 COMPLETE CBC W/AUTO DIFF WBC: CPT

## 2022-07-29 PROCEDURE — 80048 BASIC METABOLIC PNL TOTAL CA: CPT

## 2022-07-29 PROCEDURE — 6360000002 HC RX W HCPCS: Performed by: EMERGENCY MEDICINE

## 2022-07-29 PROCEDURE — 6370000000 HC RX 637 (ALT 250 FOR IP): Performed by: NURSE PRACTITIONER

## 2022-07-29 PROCEDURE — 87040 BLOOD CULTURE FOR BACTERIA: CPT

## 2022-07-29 PROCEDURE — 87070 CULTURE OTHR SPECIMN AEROBIC: CPT

## 2022-07-29 PROCEDURE — 86140 C-REACTIVE PROTEIN: CPT

## 2022-07-29 PROCEDURE — 36415 COLL VENOUS BLD VENIPUNCTURE: CPT

## 2022-07-29 PROCEDURE — 2580000003 HC RX 258: Performed by: EMERGENCY MEDICINE

## 2022-07-29 PROCEDURE — 80076 HEPATIC FUNCTION PANEL: CPT

## 2022-07-29 PROCEDURE — 82962 GLUCOSE BLOOD TEST: CPT

## 2022-07-29 PROCEDURE — 6360000002 HC RX W HCPCS: Performed by: NURSE PRACTITIONER

## 2022-07-29 RX ORDER — CITALOPRAM 20 MG/1
20 TABLET ORAL DAILY
Status: DISCONTINUED | OUTPATIENT
Start: 2022-07-29 | End: 2022-07-29

## 2022-07-29 RX ORDER — ACETAMINOPHEN 650 MG/1
650 SUPPOSITORY RECTAL EVERY 6 HOURS PRN
Status: DISCONTINUED | OUTPATIENT
Start: 2022-07-29 | End: 2022-08-02 | Stop reason: HOSPADM

## 2022-07-29 RX ORDER — FERROUS SULFATE 325(65) MG
325 TABLET ORAL
Status: DISCONTINUED | OUTPATIENT
Start: 2022-07-30 | End: 2022-08-02 | Stop reason: HOSPADM

## 2022-07-29 RX ORDER — ONDANSETRON 2 MG/ML
4 INJECTION INTRAMUSCULAR; INTRAVENOUS EVERY 6 HOURS PRN
Status: DISCONTINUED | OUTPATIENT
Start: 2022-07-29 | End: 2022-08-02 | Stop reason: HOSPADM

## 2022-07-29 RX ORDER — ERGOCALCIFEROL (VITAMIN D2) 1250 MCG
50000 CAPSULE ORAL WEEKLY
COMMUNITY
Start: 2022-03-02

## 2022-07-29 RX ORDER — DEXTROSE MONOHYDRATE 100 MG/ML
INJECTION, SOLUTION INTRAVENOUS CONTINUOUS PRN
Status: DISCONTINUED | OUTPATIENT
Start: 2022-07-29 | End: 2022-08-02 | Stop reason: HOSPADM

## 2022-07-29 RX ORDER — FENTANYL CITRATE 50 UG/ML
75 INJECTION, SOLUTION INTRAMUSCULAR; INTRAVENOUS ONCE
Status: COMPLETED | OUTPATIENT
Start: 2022-07-29 | End: 2022-07-29

## 2022-07-29 RX ORDER — DULOXETIN HYDROCHLORIDE 60 MG/1
60 CAPSULE, DELAYED RELEASE ORAL EVERY MORNING
COMMUNITY

## 2022-07-29 RX ORDER — SODIUM CHLORIDE 0.9 % (FLUSH) 0.9 %
10 SYRINGE (ML) INJECTION PRN
Status: DISCONTINUED | OUTPATIENT
Start: 2022-07-29 | End: 2022-08-02 | Stop reason: HOSPADM

## 2022-07-29 RX ORDER — SODIUM CHLORIDE 0.9 % (FLUSH) 0.9 %
10 SYRINGE (ML) INJECTION EVERY 12 HOURS SCHEDULED
Status: DISCONTINUED | OUTPATIENT
Start: 2022-07-29 | End: 2022-08-02 | Stop reason: HOSPADM

## 2022-07-29 RX ORDER — ACETAMINOPHEN 325 MG/1
650 TABLET ORAL EVERY 6 HOURS PRN
Status: DISCONTINUED | OUTPATIENT
Start: 2022-07-29 | End: 2022-08-02 | Stop reason: HOSPADM

## 2022-07-29 RX ORDER — TRAMADOL HYDROCHLORIDE 50 MG/1
50 TABLET ORAL EVERY 6 HOURS PRN
COMMUNITY
Start: 2022-06-27 | End: 2022-08-18

## 2022-07-29 RX ORDER — LOSARTAN POTASSIUM AND HYDROCHLOROTHIAZIDE 12.5; 5 MG/1; MG/1
1 TABLET ORAL DAILY
Status: DISCONTINUED | OUTPATIENT
Start: 2022-07-29 | End: 2022-08-02 | Stop reason: HOSPADM

## 2022-07-29 RX ORDER — INSULIN LISPRO 100 [IU]/ML
0-4 INJECTION, SOLUTION INTRAVENOUS; SUBCUTANEOUS NIGHTLY
Status: DISCONTINUED | OUTPATIENT
Start: 2022-07-29 | End: 2022-08-02 | Stop reason: HOSPADM

## 2022-07-29 RX ORDER — SULFAMETHOXAZOLE AND TRIMETHOPRIM 800; 160 MG/1; MG/1
1 TABLET ORAL 2 TIMES DAILY
Status: ON HOLD | COMMUNITY
Start: 2022-07-25 | End: 2022-08-02 | Stop reason: HOSPADM

## 2022-07-29 RX ORDER — HYDRALAZINE HYDROCHLORIDE 20 MG/ML
5 INJECTION INTRAMUSCULAR; INTRAVENOUS EVERY 6 HOURS PRN
Status: DISCONTINUED | OUTPATIENT
Start: 2022-07-29 | End: 2022-08-02 | Stop reason: HOSPADM

## 2022-07-29 RX ORDER — DULOXETIN HYDROCHLORIDE 60 MG/1
60 CAPSULE, DELAYED RELEASE ORAL DAILY
COMMUNITY
Start: 2022-06-12 | End: 2022-07-29 | Stop reason: ALTCHOICE

## 2022-07-29 RX ORDER — ASPIRIN 81 MG/1
81 TABLET, CHEWABLE ORAL DAILY
Status: DISCONTINUED | OUTPATIENT
Start: 2022-07-30 | End: 2022-08-02 | Stop reason: HOSPADM

## 2022-07-29 RX ORDER — ATORVASTATIN CALCIUM 40 MG/1
40 TABLET, FILM COATED ORAL DAILY
Status: DISCONTINUED | OUTPATIENT
Start: 2022-07-30 | End: 2022-08-02 | Stop reason: HOSPADM

## 2022-07-29 RX ORDER — IBUPROFEN 200 MG
400-800 TABLET ORAL EVERY 4 HOURS PRN
Status: ON HOLD | COMMUNITY
Start: 2021-11-10 | End: 2022-08-02 | Stop reason: HOSPADM

## 2022-07-29 RX ORDER — HYDROCODONE BITARTRATE AND ACETAMINOPHEN 5; 325 MG/1; MG/1
1 TABLET ORAL EVERY 6 HOURS PRN
Status: DISCONTINUED | OUTPATIENT
Start: 2022-07-29 | End: 2022-08-02 | Stop reason: HOSPADM

## 2022-07-29 RX ORDER — SODIUM CHLORIDE 9 MG/ML
INJECTION, SOLUTION INTRAVENOUS CONTINUOUS
Status: DISCONTINUED | OUTPATIENT
Start: 2022-07-29 | End: 2022-08-02 | Stop reason: HOSPADM

## 2022-07-29 RX ORDER — SODIUM CHLORIDE 9 MG/ML
INJECTION, SOLUTION INTRAVENOUS PRN
Status: DISCONTINUED | OUTPATIENT
Start: 2022-07-29 | End: 2022-08-02 | Stop reason: HOSPADM

## 2022-07-29 RX ORDER — ENOXAPARIN SODIUM 100 MG/ML
40 INJECTION SUBCUTANEOUS DAILY
Status: DISCONTINUED | OUTPATIENT
Start: 2022-07-29 | End: 2022-08-02 | Stop reason: HOSPADM

## 2022-07-29 RX ORDER — INSULIN LISPRO 100 [IU]/ML
0-8 INJECTION, SOLUTION INTRAVENOUS; SUBCUTANEOUS
Status: DISCONTINUED | OUTPATIENT
Start: 2022-07-29 | End: 2022-08-02 | Stop reason: HOSPADM

## 2022-07-29 RX ORDER — ALLOPURINOL 300 MG/1
300 TABLET ORAL DAILY
Status: DISCONTINUED | OUTPATIENT
Start: 2022-07-30 | End: 2022-08-02 | Stop reason: HOSPADM

## 2022-07-29 RX ORDER — ONDANSETRON 4 MG/1
4 TABLET, ORALLY DISINTEGRATING ORAL EVERY 8 HOURS PRN
Status: DISCONTINUED | OUTPATIENT
Start: 2022-07-29 | End: 2022-08-02 | Stop reason: HOSPADM

## 2022-07-29 RX ORDER — 0.9 % SODIUM CHLORIDE 0.9 %
1000 INTRAVENOUS SOLUTION INTRAVENOUS ONCE
Status: COMPLETED | OUTPATIENT
Start: 2022-07-29 | End: 2022-07-29

## 2022-07-29 RX ADMIN — WATER 2000 MG: 1 INJECTION INTRAMUSCULAR; INTRAVENOUS; SUBCUTANEOUS at 13:12

## 2022-07-29 RX ADMIN — HYDROCODONE BITARTRATE AND ACETAMINOPHEN 1 TABLET: 5; 325 TABLET ORAL at 21:23

## 2022-07-29 RX ADMIN — SODIUM CHLORIDE 1000 ML: 9 INJECTION, SOLUTION INTRAVENOUS at 12:52

## 2022-07-29 RX ADMIN — WATER 1000 MG: 1 INJECTION INTRAMUSCULAR; INTRAVENOUS; SUBCUTANEOUS at 20:51

## 2022-07-29 RX ADMIN — ACETAMINOPHEN 650 MG: 325 TABLET ORAL at 18:22

## 2022-07-29 RX ADMIN — SODIUM CHLORIDE: 9 INJECTION, SOLUTION INTRAVENOUS at 15:23

## 2022-07-29 RX ADMIN — HYDROCODONE BITARTRATE AND ACETAMINOPHEN 1 TABLET: 5; 325 TABLET ORAL at 15:13

## 2022-07-29 RX ADMIN — FENTANYL CITRATE 75 MCG: 50 INJECTION, SOLUTION INTRAMUSCULAR; INTRAVENOUS at 13:10

## 2022-07-29 ASSESSMENT — ENCOUNTER SYMPTOMS
NAUSEA: 0
VOMITING: 0
ABDOMINAL DISTENTION: 0
EYE PAIN: 0
BACK PAIN: 0
SORE THROAT: 0
COUGH: 0
EYE REDNESS: 0
DIARRHEA: 0
EYE DISCHARGE: 0
SINUS PRESSURE: 0
WHEEZING: 0
SHORTNESS OF BREATH: 0

## 2022-07-29 ASSESSMENT — PAIN SCALES - GENERAL
PAINLEVEL_OUTOF10: 4
PAINLEVEL_OUTOF10: 5
PAINLEVEL_OUTOF10: 7
PAINLEVEL_OUTOF10: 9
PAINLEVEL_OUTOF10: 5
PAINLEVEL_OUTOF10: 10
PAINLEVEL_OUTOF10: 5

## 2022-07-29 ASSESSMENT — PAIN DESCRIPTION - ONSET
ONSET: ON-GOING
ONSET: ON-GOING

## 2022-07-29 ASSESSMENT — PAIN DESCRIPTION - ORIENTATION
ORIENTATION: LEFT

## 2022-07-29 ASSESSMENT — PAIN DESCRIPTION - LOCATION
LOCATION: LEG;ANKLE
LOCATION: ANKLE
LOCATION: LEG

## 2022-07-29 ASSESSMENT — PAIN DESCRIPTION - PAIN TYPE
TYPE: ACUTE PAIN
TYPE: ACUTE PAIN

## 2022-07-29 ASSESSMENT — PAIN DESCRIPTION - DIRECTION: RADIATING_TOWARDS: NON-RADIATING

## 2022-07-29 ASSESSMENT — PAIN DESCRIPTION - DESCRIPTORS
DESCRIPTORS: BURNING
DESCRIPTORS: BURNING;THROBBING
DESCRIPTORS: BURNING

## 2022-07-29 ASSESSMENT — PAIN - FUNCTIONAL ASSESSMENT
PAIN_FUNCTIONAL_ASSESSMENT: ACTIVITIES ARE NOT PREVENTED
PAIN_FUNCTIONAL_ASSESSMENT: ACTIVITIES ARE NOT PREVENTED

## 2022-07-29 ASSESSMENT — PAIN DESCRIPTION - FREQUENCY
FREQUENCY: CONTINUOUS
FREQUENCY: CONTINUOUS

## 2022-07-29 NOTE — ED PROVIDER NOTES
72-year-old female history of a tattoo placed on her left ankle approximately 10 months ago presents to the emergency department with a wound at the site of that tattoo. She was seen initially on Monday by her PCP and started on Bactrim and states since being started on that the symptoms have worsened. Patient was reevaluated by her primary care doctor today who sent her in for probable IV antibiotics patient states she noticed wound approximately 2 weeks ago. She thought it was just a blister and it would heal but is gotten worse. She states burning pain around the site of the wound there is no other complaints at this time. She has noticed some increasing reddening from the lateral ankle to the medial ankle    The history is provided by the patient. Leg Injury  Location:  Ankle  Time since incident:  3 weeks  Lower extremity injury: unknown. Ankle location:  L ankle  Pain details:     Quality:  Aching and burning    Radiates to:  Does not radiate    Severity:  Moderate    Onset quality:  Gradual    Duration:  3 weeks    Progression:  Worsening  Chronicity:  New  Relieved by:  Nothing  Worsened by:  Nothing  Ineffective treatments: bactrim. Associated symptoms: no back pain, no decreased ROM, no fatigue, no fever, no neck pain, no swelling and no tingling       Review of Systems   Constitutional:  Negative for chills, fatigue and fever. HENT:  Negative for ear pain, sinus pressure and sore throat. Eyes:  Negative for pain, discharge and redness. Respiratory:  Negative for cough, shortness of breath and wheezing. Cardiovascular:  Negative for chest pain. Gastrointestinal:  Negative for abdominal distention, diarrhea, nausea and vomiting. Genitourinary:  Negative for dysuria and frequency. Musculoskeletal:  Negative for arthralgias, back pain and neck pain. Skin:  Positive for wound. Negative for rash. Neurological:  Negative for weakness and headaches.    Hematological:  Negative for Tonsillectomy; Colonoscopy; Carpal tunnel release; Knee arthroscopy; Upper gastrointestinal endoscopy; colectomy; and pr excis rectal lesion (N/A, 2/13/2019). Social History:  reports that she quit smoking about 30 years ago. Her smoking use included cigarettes. She has a 10.00 pack-year smoking history. She has never used smokeless tobacco. She reports that she does not drink alcohol and does not use drugs. Family History: family history is not on file. The patients home medications have been reviewed. Allergies: Patient has no known allergies.     -------------------------------------------------- RESULTS -------------------------------------------------    LABS:  Results for orders placed or performed during the hospital encounter of 07/29/22   CBC with Auto Differential   Result Value Ref Range    WBC 12.7 (H) 4.5 - 11.5 E9/L    RBC 3.33 (L) 3.50 - 5.50 E12/L    Hemoglobin 9.2 (L) 11.5 - 15.5 g/dL    Hematocrit 29.6 (L) 34.0 - 48.0 %    MCV 88.9 80.0 - 99.9 fL    MCH 27.6 26.0 - 35.0 pg    MCHC 31.1 (L) 32.0 - 34.5 %    RDW 14.0 11.5 - 15.0 fL    Platelets 884 163 - 301 E9/L    MPV 9.6 7.0 - 12.0 fL    Neutrophils % 83.1 (H) 43.0 - 80.0 %    Immature Granulocytes % 0.6 0.0 - 5.0 %    Lymphocytes % 9.8 (L) 20.0 - 42.0 %    Monocytes % 4.6 2.0 - 12.0 %    Eosinophils % 1.7 0.0 - 6.0 %    Basophils % 0.2 0.0 - 2.0 %    Neutrophils Absolute 10.52 (H) 1.80 - 7.30 E9/L    Immature Granulocytes # 0.07 E9/L    Lymphocytes Absolute 1.24 (L) 1.50 - 4.00 E9/L    Monocytes Absolute 0.58 0.10 - 0.95 E9/L    Eosinophils Absolute 0.22 0.05 - 0.50 E9/L    Basophils Absolute 0.03 0.00 - 0.20 J6/L   Basic Metabolic Panel   Result Value Ref Range    Sodium 134 132 - 146 mmol/L    Potassium 3.9 3.5 - 5.0 mmol/L    Chloride 100 98 - 107 mmol/L    CO2 19 (L) 22 - 29 mmol/L    Anion Gap 15 7 - 16 mmol/L    Glucose 199 (H) 74 - 99 mg/dL    BUN 27 (H) 6 - 23 mg/dL    Creatinine 1.7 (H) 0.5 - 1.0 mg/dL    GFR Non-African American 30 >=60 mL/min/1.73    GFR African American 36     Calcium 10.9 (H) 8.6 - 10.2 mg/dL   Hepatic Function Panel   Result Value Ref Range    Total Protein 7.7 6.4 - 8.3 g/dL    Albumin 3.5 3.5 - 5.2 g/dL    Alkaline Phosphatase 93 35 - 104 U/L    ALT 16 0 - 32 U/L    AST 14 0 - 31 U/L    Total Bilirubin 0.2 0.0 - 1.2 mg/dL    Bilirubin, Direct <0.2 0.0 - 0.3 mg/dL    Bilirubin, Indirect see below 0.0 - 1.0 mg/dL   Sedimentation Rate   Result Value Ref Range    Sed Rate 124 (H) 0 - 20 mm/Hr   C-Reactive Protein   Result Value Ref Range    CRP 12.1 (H) 0.0 - 0.4 mg/dL   Procalcitonin   Result Value Ref Range    Procalcitonin 0.11 (H) 0.00 - 0.08 ng/mL   Lactate, Sepsis   Result Value Ref Range    Lactic Acid, Sepsis 1.3 0.5 - 1.9 mmol/L   Hemoglobin A1c   Result Value Ref Range    Hemoglobin A1C 9.0 (H) 4.0 - 5.6 %   Basic Metabolic Panel w/ Reflex to MG   Result Value Ref Range    Sodium 133 132 - 146 mmol/L    Potassium reflex Magnesium 4.2 3.5 - 5.0 mmol/L    Chloride 103 98 - 107 mmol/L    CO2 21 (L) 22 - 29 mmol/L    Anion Gap 9 7 - 16 mmol/L    Glucose 208 (H) 74 - 99 mg/dL    BUN 20 6 - 23 mg/dL    Creatinine 1.4 (H) 0.5 - 1.0 mg/dL    GFR Non-African American 37 >=60 mL/min/1.73    GFR African American 45     Calcium 9.8 8.6 - 10.2 mg/dL   CBC with Auto Differential   Result Value Ref Range    WBC 9.2 4.5 - 11.5 E9/L    RBC 2.93 (L) 3.50 - 5.50 E12/L    Hemoglobin 8.2 (L) 11.5 - 15.5 g/dL    Hematocrit 26.6 (L) 34.0 - 48.0 %    MCV 90.8 80.0 - 99.9 fL    MCH 28.0 26.0 - 35.0 pg    MCHC 30.8 (L) 32.0 - 34.5 %    RDW 14.0 11.5 - 15.0 fL    Platelets 785 936 - 611 E9/L    MPV 9.2 7.0 - 12.0 fL    Neutrophils % 75.4 43.0 - 80.0 %    Immature Granulocytes % 0.5 0.0 - 5.0 %    Lymphocytes % 14.3 (L) 20.0 - 42.0 %    Monocytes % 7.2 2.0 - 12.0 %    Eosinophils % 2.4 0.0 - 6.0 %    Basophils % 0.2 0.0 - 2.0 %    Neutrophils Absolute 6.94 1.80 - 7.30 E9/L    Immature Granulocytes # 0.05 E9/L    Lymphocytes Absolute 1.32 (L) 1.50 - 4.00 E9/L    Monocytes Absolute 0.66 0.10 - 0.95 E9/L    Eosinophils Absolute 0.22 0.05 - 0.50 E9/L    Basophils Absolute 0.02 0.00 - 0.20 E9/L   POCT Glucose   Result Value Ref Range    Meter Glucose 143 (H) 74 - 99 mg/dL   POCT Glucose   Result Value Ref Range    Meter Glucose 259 (H) 74 - 99 mg/dL   POCT Glucose   Result Value Ref Range    Meter Glucose 175 (H) 74 - 99 mg/dL   POCT Glucose   Result Value Ref Range    Meter Glucose 205 (H) 74 - 99 mg/dL       RADIOLOGY:  No results found.    ------------------------- NURSING NOTES AND VITALS REVIEWED ---------------------------  Date / Time Roomed:  7/29/2022 11:06 AM  ED Bed Assignment:  [id number]    The nursing notes within the ED encounter and vital signs as below have been reviewed. Patient Vitals for the past 24 hrs:   BP Temp Temp src Pulse Resp SpO2 Weight   07/30/22 0745 (!) 114/56 98.8 °F (37.1 °C) Oral 83 18 98 % --   07/30/22 0132 -- -- -- -- -- -- 197 lb 6.4 oz (89.5 kg)   07/29/22 2153 105/66 98.8 °F (37.1 °C) Oral 90 18 -- --   07/29/22 1430 127/67 99.2 °F (37.3 °C) Oral 94 18 100 % --   07/29/22 1310 120/66 -- -- 96 -- 100 % --   07/29/22 1257 114/68 -- -- 99 16 98 % --       Oxygen Saturation Interpretation: Normal    ------------------------------------------ PROGRESS NOTES ------------------------------------------  Counseling:  I have spoken with the patient and discussed todays results, in addition to providing specific details for the plan of care and counseling regarding the diagnosis and prognosis. Their questions are answered at this time and they are agreeable with the plan of admission.    --------------------------------- ADDITIONAL PROVIDER NOTES ---------------------------------  This patient has remained hemodynamically stable during their ED course. Diagnosis:  1. Cellulitis of left ankle        Disposition:  Patient's disposition: Admit to med/surg floor  Patient's condition is fair. Lina Galo, DO  07/30/22 1144

## 2022-07-29 NOTE — PROGRESS NOTES
Database initiated pharmacy and medications verified with the patient.  Patient is A&O independent from home with

## 2022-07-29 NOTE — ED NOTES
Pt has erythema, inflammation, pain and open wound to left lateral ankle. Pt was tattooed in this area in Sept 2021, she started with rash 3 weeks ago which has gotten worse. Pt has seen drs and used creams to no avail, wound continues to worsen. Open wound on areas where tattoo ink was placed and erythema surrounding extending onto shin. Pt reports burning and intense pain. Denies fevers, n/v.       Hayder Ceron RN  07/29/22 3411

## 2022-07-29 NOTE — PROGRESS NOTES
Wound care consult placed via perfect serve text message.        Electronically signed by Aster Ellis RN on 7/29/2022 at 7:10 PM

## 2022-07-30 LAB
ANION GAP SERPL CALCULATED.3IONS-SCNC: 9 MMOL/L (ref 7–16)
ANTISTREPTOLYSIN-O: 72 IU/ML (ref 0–200)
BASOPHILS ABSOLUTE: 0.02 E9/L (ref 0–0.2)
BASOPHILS RELATIVE PERCENT: 0.2 % (ref 0–2)
BUN BLDV-MCNC: 20 MG/DL (ref 6–23)
C-REACTIVE PROTEIN: 13.3 MG/DL (ref 0–0.4)
CALCIUM SERPL-MCNC: 9.8 MG/DL (ref 8.6–10.2)
CHLORIDE BLD-SCNC: 103 MMOL/L (ref 98–107)
CO2: 21 MMOL/L (ref 22–29)
CREAT SERPL-MCNC: 1.4 MG/DL (ref 0.5–1)
EOSINOPHILS ABSOLUTE: 0.22 E9/L (ref 0.05–0.5)
EOSINOPHILS RELATIVE PERCENT: 2.4 % (ref 0–6)
GFR AFRICAN AMERICAN: 45
GFR NON-AFRICAN AMERICAN: 37 ML/MIN/1.73
GLUCOSE BLD-MCNC: 208 MG/DL (ref 74–99)
HBA1C MFR BLD: 9 % (ref 4–5.6)
HCT VFR BLD CALC: 26.6 % (ref 34–48)
HEMOGLOBIN: 8.2 G/DL (ref 11.5–15.5)
IMMATURE GRANULOCYTES #: 0.05 E9/L
IMMATURE GRANULOCYTES %: 0.5 % (ref 0–5)
LYMPHOCYTES ABSOLUTE: 1.32 E9/L (ref 1.5–4)
LYMPHOCYTES RELATIVE PERCENT: 14.3 % (ref 20–42)
MCH RBC QN AUTO: 28 PG (ref 26–35)
MCHC RBC AUTO-ENTMCNC: 30.8 % (ref 32–34.5)
MCV RBC AUTO: 90.8 FL (ref 80–99.9)
METER GLUCOSE: 140 MG/DL (ref 74–99)
METER GLUCOSE: 175 MG/DL (ref 74–99)
METER GLUCOSE: 205 MG/DL (ref 74–99)
METER GLUCOSE: 257 MG/DL (ref 74–99)
MONOCYTES ABSOLUTE: 0.66 E9/L (ref 0.1–0.95)
MONOCYTES RELATIVE PERCENT: 7.2 % (ref 2–12)
NEUTROPHILS ABSOLUTE: 6.94 E9/L (ref 1.8–7.3)
NEUTROPHILS RELATIVE PERCENT: 75.4 % (ref 43–80)
PDW BLD-RTO: 14 FL (ref 11.5–15)
PLATELET # BLD: 355 E9/L (ref 130–450)
PMV BLD AUTO: 9.2 FL (ref 7–12)
POTASSIUM REFLEX MAGNESIUM: 4.2 MMOL/L (ref 3.5–5)
RBC # BLD: 2.93 E12/L (ref 3.5–5.5)
SEDIMENTATION RATE, ERYTHROCYTE: 125 MM/HR (ref 0–20)
SODIUM BLD-SCNC: 133 MMOL/L (ref 132–146)
WBC # BLD: 9.2 E9/L (ref 4.5–11.5)

## 2022-07-30 PROCEDURE — 85025 COMPLETE CBC W/AUTO DIFF WBC: CPT

## 2022-07-30 PROCEDURE — 86060 ANTISTREPTOLYSIN O TITER: CPT

## 2022-07-30 PROCEDURE — 86140 C-REACTIVE PROTEIN: CPT

## 2022-07-30 PROCEDURE — 1200000000 HC SEMI PRIVATE

## 2022-07-30 PROCEDURE — 6370000000 HC RX 637 (ALT 250 FOR IP): Performed by: SPECIALIST

## 2022-07-30 PROCEDURE — 82962 GLUCOSE BLOOD TEST: CPT

## 2022-07-30 PROCEDURE — 80048 BASIC METABOLIC PNL TOTAL CA: CPT

## 2022-07-30 PROCEDURE — 2580000003 HC RX 258: Performed by: NURSE PRACTITIONER

## 2022-07-30 PROCEDURE — 83036 HEMOGLOBIN GLYCOSYLATED A1C: CPT

## 2022-07-30 PROCEDURE — 6370000000 HC RX 637 (ALT 250 FOR IP): Performed by: NURSE PRACTITIONER

## 2022-07-30 PROCEDURE — 36415 COLL VENOUS BLD VENIPUNCTURE: CPT

## 2022-07-30 PROCEDURE — 6360000002 HC RX W HCPCS: Performed by: NURSE PRACTITIONER

## 2022-07-30 PROCEDURE — 85651 RBC SED RATE NONAUTOMATED: CPT

## 2022-07-30 RX ORDER — GABAPENTIN 100 MG/1
100 CAPSULE ORAL 3 TIMES DAILY
Status: DISCONTINUED | OUTPATIENT
Start: 2022-07-30 | End: 2022-08-02 | Stop reason: HOSPADM

## 2022-07-30 RX ORDER — DOXYCYCLINE HYCLATE 100 MG/1
100 CAPSULE ORAL EVERY 12 HOURS SCHEDULED
Status: DISCONTINUED | OUTPATIENT
Start: 2022-07-30 | End: 2022-08-02 | Stop reason: HOSPADM

## 2022-07-30 RX ORDER — IBUPROFEN 800 MG/1
400 TABLET ORAL EVERY 8 HOURS PRN
Status: DISCONTINUED | OUTPATIENT
Start: 2022-07-30 | End: 2022-08-02 | Stop reason: HOSPADM

## 2022-07-30 RX ADMIN — SODIUM CHLORIDE: 9 INJECTION, SOLUTION INTRAVENOUS at 13:54

## 2022-07-30 RX ADMIN — DOXYCYCLINE HYCLATE 100 MG: 100 CAPSULE ORAL at 20:21

## 2022-07-30 RX ADMIN — ALLOPURINOL 300 MG: 300 TABLET ORAL at 07:50

## 2022-07-30 RX ADMIN — IBUPROFEN 400 MG: 800 TABLET, FILM COATED ORAL at 20:21

## 2022-07-30 RX ADMIN — SODIUM CHLORIDE: 9 INJECTION, SOLUTION INTRAVENOUS at 02:18

## 2022-07-30 RX ADMIN — GABAPENTIN 100 MG: 100 CAPSULE ORAL at 14:35

## 2022-07-30 RX ADMIN — FERROUS SULFATE TAB 325 MG (65 MG ELEMENTAL FE) 325 MG: 325 (65 FE) TAB at 07:50

## 2022-07-30 RX ADMIN — ATORVASTATIN CALCIUM 40 MG: 40 TABLET, FILM COATED ORAL at 07:50

## 2022-07-30 RX ADMIN — INSULIN LISPRO 2 UNITS: 100 INJECTION, SOLUTION INTRAVENOUS; SUBCUTANEOUS at 11:21

## 2022-07-30 RX ADMIN — WATER 1000 MG: 1 INJECTION INTRAMUSCULAR; INTRAVENOUS; SUBCUTANEOUS at 20:21

## 2022-07-30 RX ADMIN — ACETAMINOPHEN 650 MG: 325 TABLET ORAL at 02:16

## 2022-07-30 RX ADMIN — ASPIRIN 81 MG CHEWABLE TABLET 81 MG: 81 TABLET CHEWABLE at 07:50

## 2022-07-30 RX ADMIN — HYDROCODONE BITARTRATE AND ACETAMINOPHEN 1 TABLET: 5; 325 TABLET ORAL at 13:52

## 2022-07-30 RX ADMIN — ENOXAPARIN SODIUM 40 MG: 100 INJECTION SUBCUTANEOUS at 07:51

## 2022-07-30 RX ADMIN — WATER 1000 MG: 1 INJECTION INTRAMUSCULAR; INTRAVENOUS; SUBCUTANEOUS at 05:00

## 2022-07-30 RX ADMIN — HYDROCODONE BITARTRATE AND ACETAMINOPHEN 1 TABLET: 5; 325 TABLET ORAL at 07:54

## 2022-07-30 RX ADMIN — WATER 1000 MG: 1 INJECTION INTRAMUSCULAR; INTRAVENOUS; SUBCUTANEOUS at 13:42

## 2022-07-30 ASSESSMENT — PAIN DESCRIPTION - DESCRIPTORS
DESCRIPTORS: BURNING;ACHING
DESCRIPTORS: BURNING
DESCRIPTORS: BURNING

## 2022-07-30 ASSESSMENT — PAIN SCALES - GENERAL
PAINLEVEL_OUTOF10: 5
PAINLEVEL_OUTOF10: 4
PAINLEVEL_OUTOF10: 5
PAINLEVEL_OUTOF10: 4

## 2022-07-30 ASSESSMENT — PAIN DESCRIPTION - DIRECTION: RADIATING_TOWARDS: NON-RADIATING

## 2022-07-30 ASSESSMENT — PAIN DESCRIPTION - LOCATION
LOCATION: ANKLE
LOCATION: ANKLE;LEG
LOCATION: LEG

## 2022-07-30 ASSESSMENT — PAIN DESCRIPTION - ORIENTATION
ORIENTATION: LEFT
ORIENTATION: LEFT

## 2022-07-30 ASSESSMENT — PAIN DESCRIPTION - ONSET: ONSET: ON-GOING

## 2022-07-30 ASSESSMENT — PAIN - FUNCTIONAL ASSESSMENT: PAIN_FUNCTIONAL_ASSESSMENT: ACTIVITIES ARE NOT PREVENTED

## 2022-07-30 ASSESSMENT — PAIN DESCRIPTION - PAIN TYPE: TYPE: ACUTE PAIN

## 2022-07-30 ASSESSMENT — PAIN DESCRIPTION - FREQUENCY: FREQUENCY: CONTINUOUS

## 2022-07-30 NOTE — CONSULTS
[address] [place] [address] [facility]  Consultation Note     Admit Date: 7/29/2022 11:06 AM    Reason for Consult:   Left leg cellulitis    Attending Physician:  Baby Seip, MD    HISTORY OF PRESENT ILLNESS:             The history is obtained from extensive review of available past medical records. The patient is a 79 y.o. female who is not known to the ID service. The patient has a history of Crohn disease and has perirectal fistulas. She was on biologicals up until November 2021 when she went on Medicare and it was no longer paid for. She had a tattoo done 10 months ago on her left ankle. She had never had any problems with it until several weeks ago when she noticed some redness. She went to her PCP and was told that it might be a bug bite and was placed on Ciprofloxacin. Did not improve. She felt that it was slightly tender and slightly itchy. She never had systemic signs of infection such as fevers, chills or diaphoresis. She was told to go to see a dermatologist and was given a steroid injection and was prescribed triamcinolone to put on the wound. The area became more open and started draining some. She also developed some redness around it. She went to see her PCP again and was told to come to the ED for IV antibiotics. She was admitted on 7/29/2022 and was treated with Cefazolin.     Past Medical History:        Diagnosis Date    Arthritis     Depression     Diabetes mellitus ([facility] [address]     Hyperlipidemia     Hypertension     Ulcerative colitis ([facility] [address]      Past Surgical History:        Procedure Laterality Date    CARPAL TUNNEL RELEASE      CHOLECYSTECTOMY      COLECTOMY      COLONOSCOPY      KNEE ARTHROSCOPY      NV EXCIS RECTAL LESION N/A 2/13/2019    EXAMINE UNDER ANESTHESIA WITH RECTAL INCISION AND DRAINAGE performed by Lizzy Reynoso MD at [place] [address] ENDOSCOPY       Current Medications:   Scheduled Meds:   allopurinol  300 mg Oral Daily aspirin  81 mg Oral Daily    atorvastatin  40 mg Oral Daily    ferrous sulfate  325 mg Oral Daily with breakfast    [Held by provider] losartan-hydroCHLOROthiazide  1 tablet Oral Daily    sodium chloride flush  10 mL IntraVENous 2 times per day    enoxaparin  40 mg SubCUTAneous Daily    ceFAZolin  1,000 mg IntraVENous Q8H    insulin lispro  0-8 Units SubCUTAneous TID WC    insulin lispro  0-4 Units SubCUTAneous Nightly     Continuous Infusions:   sodium chloride 75 mL/hr at 22 0555    sodium chloride      dextrose       PRN Meds:ibuprofen, sodium chloride flush, sodium chloride flush, sodium chloride, ondansetron **OR** ondansetron, magnesium hydroxide, acetaminophen **OR** acetaminophen, glucose, dextrose bolus **OR** dextrose bolus, glucagon (rDNA), dextrose, hydrALAZINE, HYDROcodone 5 mg - acetaminophen    Allergies:  Patient has no known allergies. Social History:   Social History     Socioeconomic History    Marital status:    Occupational History    Occupation: [employer]   Tobacco Use    Smoking status: Former     Packs/day: 20.00     Years: 0.50     Pack years: 10.00     Types: Cigarettes     Quit date:      Years since quittin.5    Smokeless tobacco: Never   Vaping Use    Vaping Use: Never used   Substance and Sexual Activity    Alcohol use: No    Drug use: No      Pets: None  Travel: No  The patient lives at home with her  and 49-year-old son. She works for [employer]. Family History:   No family history on file. . Otherwise non-pertinent to the chief complaint. REVIEW OF SYSTEMS:    Constitutional: Negative for fevers, chills, diaphoresis  Neurologic: Negative   Psychiatric: Negative  Rheumatologic: Negative   Endocrine: Negative  Hematologic: Negative  Immunologic: Negative  ENT: Negative  Respiratory: Negative   Cardiovascular: Negative  GI: As in the HPI. She has Crohn's disease and goes to [facility].   She has perirectal abscess and has a seton drain. There are talks about having rectum removed. : Negative  Musculoskeletal: Negative  Skin: As in the HPI    PHYSICAL EXAM:    Vitals:   BP (!) 114/56   Pulse 83   Temp 98.8 °F (37.1 °C) (Oral)   Resp 18   Ht 5' 2\" (1.575 m)   Wt 197 lb 6.4 oz (89.5 kg)   SpO2 98%   BMI 36.10 kg/m²   Constitutional: The patient is awake, alert, and oriented. Skin: Warm and dry. No rashes were noted. HEENT: Eyes show round, and reactive pupils. No jaundice. Moist mucous membranes, no ulcerations, no thrush. Neck: Supple to movements. No lymphadenopathy. Chest: No use of accessory muscles to breathe. Symmetrical expansion. Auscultation reveals no wheezing, crackles, or rhonchi. Cardiovascular: S1 and S2 are rhythmic and regular. No murmurs appreciated. Abdomen: Positive bowel sounds to auscultation. Benign to palpation. No masses felt. No hepatosplenomegaly. Extremities: No clubbing, no cyanosis, no edema.   Lines: Peripheral.            CBC+dif:  Recent Labs     07/29/22  1205 07/30/22  0211   WBC 12.7* 9.2   HGB 9.2* 8.2*   HCT 29.6* 26.6*   MCV 88.9 90.8    355   NEUTROABS 10.52* 6.94     Lab Results   Component Value Date    CRP 12.1 (H) 07/29/2022    CRP 1.5 (H) 10/16/2020    CRP 1.6 (H) 10/19/2019      No results found for: CRPHS  Lab Results   Component Value Date    SEDRATE 124 (H) 07/29/2022    SEDRATE 59 (H) 10/16/2020    SEDRATE 80 (H) 05/11/2019     Lab Results   Component Value Date    ALT 16 07/29/2022    AST 14 07/29/2022    ALKPHOS 93 07/29/2022    BILITOT 0.2 07/29/2022     Lab Results   Component Value Date/Time     07/30/2022 02:11 AM    K 4.2 07/30/2022 02:11 AM     07/30/2022 02:11 AM    CO2 21 07/30/2022 02:11 AM    BUN 20 07/30/2022 02:11 AM    CREATININE 1.4 07/30/2022 02:11 AM    GFRAA 45 07/30/2022 02:11 AM    LABGLOM 37 07/30/2022 02:11 AM    GLUCOSE 208 07/30/2022 02:11 AM    PROT 7.7 07/29/2022 12:05 PM    LABALBU 3.5 07/29/2022 12:05 PM    CALCIUM 9.8 07/30/2022 02:11 AM    BILITOT 0.2 07/29/2022 12:05 PM    ALKPHOS 93 07/29/2022 12:05 PM    AST 14 07/29/2022 12:05 PM    ALT 16 07/29/2022 12:05 PM       No results found for: PROTIME, INR    Lab Results   Component Value Date/Time    TSH 2.710 10/19/2019 12:15 PM       Lab Results   Component Value Date/Time    COLORU Yellow 02/15/2018 10:00 PM    PHUR 5.5 02/15/2018 10:00 PM    WBCUA 5-10 02/15/2018 10:00 PM    RBCUA >20 02/15/2018 10:00 PM    BACTERIA FEW 02/15/2018 10:00 PM    CLARITYU Clear 02/15/2018 10:00 PM    SPECGRAV 1.010 02/15/2018 10:00 PM    LEUKOCYTESUR SMALL 02/15/2018 10:00 PM    UROBILINOGEN 0.2 02/15/2018 10:00 PM    BILIRUBINUR Negative 02/15/2018 10:00 PM    BLOODU LARGE 02/15/2018 10:00 PM    GLUCOSEU Negative 02/15/2018 10:00 PM       No results found for: HCO3, BE, O2SAT, PH, THGB, PCO2, PO2, TCO2  Radiology:  Noted    Microbiology:  Pending  No results for input(s): BC in the last 72 hours. No results for input(s): ORG in the last 72 hours. No results for input(s): Fabricio Fleeting in the last 72 hours. No results for input(s): STREPNEUMAGU in the last 72 hours. No results for input(s): LP1UAG in the last 72 hours. No results for input(s): ASO in the last 72 hours. No results for input(s): CULTRESP in the last 72 hours. Recent Labs     07/29/22  1205   PROCAL 0.11*       Assessment:  Skin lesion left ankle. This looks like pyoderma gangrenosum. It may have present this as a cellulitis. It is unusual that she would get this kind of reaction 10 months after the tattoo was done. She was on biologicals up until November 2021. My guess is that the biologicals were preventing this from happening. Once she started rejecting the ink, she may have developed this condition. There is some surrounding erythema and possibly cellulitis. A culture was taken but we are not privy to the results  Possible cellulitis.   It did not respond to Ciprofloxacin  Leukocytosis-improved    Plan:    Start oral Doxycycline  Continue Cefazolin for now  Skin biopsy  Consider starting steroids  Check cultures, baseline ESR, CRP, ASO  Monitor labs  Will follow with you    Thank you for having us see this patient in consultation. I will be discussing this case with the treating physicians. Spoke with nursing.     Vivi Brown MD  10:46 AM  7/30/2022

## 2022-07-30 NOTE — H&P
[facility]  History and Physical      CHIEF COMPLAINT:    Chief Complaint   Patient presents with    Wound Infection     Patient sent in by Dr Kenneth Cordoba for wound infection left ankle. Patient of Antonella Upton MD presents with:  Cellulitis    History of Present Illness:   Patient is a 40-year-old female with past medical history of arthritis, depression, DM, HLD, HTN, Crohn's disease, CKD who presents to the emergency room for a left leg infection. Patient states that she got a tattoo about 10 months ago and it healed fine. When she noticed at her doctor's appointment on July 19 that she had a reddened/purpleish area around her tattoo. She states that it was just tender to the touch but denied any fevers or chills at that time. Patient was already receiving Cipro for urinary tract infection from [facility]. Patient returned to her doctor's office on July 25 when she noticed the area turned from reddish-purple to red with swelling and pain. At that time she was placed on Bactrim p.o. Patient presented to the emergency room yesterday when the wound opened and began to drain and was having increased pain. Patient still denies any fevers or chills. Patient had an x-ray of left ankle which showed a soft tissue wound. No osteomyelitis. Labs were consistent with her CKD 27/1.7 although slightly worse than baseline, CRP of 12.1, Pro-Endy 0.11, leukocytosis of 12.7, sed rate of 124. On assessment patient denies any CP, SOB, abdominal pain, fever, chills, N/V/D. Patient is admitted to [place] [address] for further work-up and treatment. REVIEW OF SYSTEMS:  Pertinent negatives are above in HPI. 10 point ROS otherwise negative.       Past Medical History:   Diagnosis Date    Arthritis     Depression     Diabetes mellitus ([facility] [address]     Hyperlipidemia     Hypertension     Ulcerative colitis ([facility] [address]          Past Surgical History:   Procedure Laterality Date    CARPAL TUNNEL RELEASE      CHOLECYSTECTOMY COLECTOMY      COLONOSCOPY      KNEE ARTHROSCOPY      IN EXCIS RECTAL LESION N/A 2/13/2019    EXAMINE UNDER ANESTHESIA WITH RECTAL INCISION AND DRAINAGE performed by Margarita Baez MD at [place] [address] ENDOSCOPY         Medications Prior to Admission:    Medications Prior to Admission: ergocalciferol (ERGOCALCIFEROL) 1.25 MG (44576 UT) capsule, Take 50,000 Units by mouth once a week **FRIDAYS**  ibuprofen (ADVIL;MOTRIN) 200 MG tablet, Take 400-800 mg by mouth every 4 hours as needed  DULoxetine (CYMBALTA) 60 MG extended release capsule, Take 60 mg by mouth every morning  sulfamethoxazole-trimethoprim (BACTRIM DS;SEPTRA DS) 800-160 MG per tablet, Take 1 tablet by mouth in the morning and 1 tablet before bedtime. traMADol (ULTRAM) 50 MG tablet, Take 50 mg by mouth every 6 hours as needed for Pain.  losartan-hydrochlorothiazide (HYZAAR) 50-12.5 MG per tablet, Take 1 tablet by mouth every morning  atorvastatin (LIPITOR) 40 MG tablet, Take 40 mg by mouth every morning  metFORMIN (GLUCOPHAGE) 1000 MG tablet, Take 2,000 mg by mouth daily (with breakfast)  ferrous sulfate 325 (65 FE) MG tablet, Take 325 mg by mouth daily (with breakfast)   aspirin 81 MG chewable tablet, Take 81 mg by mouth every morning  allopurinol (ZYLOPRIM) 300 MG tablet, Take 300 mg by mouth every morning    Note that the patient's home medications were reviewed and the above list is accurate to the best of my knowledge at the time of the exam.    Allergies:    Patient has no known allergies. Social History:    reports that she quit smoking about 30 years ago. Her smoking use included cigarettes. She has a 10.00 pack-year smoking history. She has never used smokeless tobacco. She reports that she does not drink alcohol and does not use drugs. Family History:   family history is not on file.       PHYSICAL EXAM:    Vitals:  BP (!) 114/56   Pulse 83   Temp 98.8 °F (37.1 °C) (Oral)   Resp 18   Ht 5' 2\" (1.575 m)   Wt 197 lb 6.4 oz (89.5 kg)   SpO2 98%   BMI 36.10 kg/m²       General appearance: NAD, conversant  Eyes: Sclerae anicteric, PERRLA  HEENT: AT/NC, MMM  Neck: FROM, supple, no thyromegaly  Lymph: No cervical / supraclavicular lymphadenopathy  Lungs: Clear to auscultation, WOB normal  CV: RRR, no MRGs, no lower extremity edema  Abdomen: Soft, non-tender; no masses or HSM, +BS  Extremities: FROM without synovitis. No clubbing or cyanosis of the hands. Skin: left lateral leg wound     Psych: Calm and cooperative. Normal judgement and insight. Normal mood and affect. Neuro: Alert and interactive, face symmetric, speech fluent. LABS:  All labs reviewed. Of note:  CBC:   Lab Results   Component Value Date/Time    WBC 9.2 07/30/2022 02:11 AM    RBC 2.93 07/30/2022 02:11 AM    HGB 8.2 07/30/2022 02:11 AM    HCT 26.6 07/30/2022 02:11 AM    MCV 90.8 07/30/2022 02:11 AM    MCH 28.0 07/30/2022 02:11 AM    MCHC 30.8 07/30/2022 02:11 AM    RDW 14.0 07/30/2022 02:11 AM     07/30/2022 02:11 AM    MPV 9.2 07/30/2022 02:11 AM     CMP:    Lab Results   Component Value Date/Time     07/30/2022 02:11 AM    K 4.2 07/30/2022 02:11 AM     07/30/2022 02:11 AM    CO2 21 07/30/2022 02:11 AM    BUN 20 07/30/2022 02:11 AM    CREATININE 1.4 07/30/2022 02:11 AM    GFRAA 45 07/30/2022 02:11 AM    LABGLOM 37 07/30/2022 02:11 AM    GLUCOSE 208 07/30/2022 02:11 AM    PROT 7.7 07/29/2022 12:05 PM    LABALBU 3.5 07/29/2022 12:05 PM    CALCIUM 9.8 07/30/2022 02:11 AM    BILITOT 0.2 07/29/2022 12:05 PM    ALKPHOS 93 07/29/2022 12:05 PM    AST 14 07/29/2022 12:05 PM    ALT 16 07/29/2022 12:05 PM       Imaging:  X-ray left ankle: Soft tissue swelling. No osteomyelitis. EKG:  Not obtained    Telemetry:  Not on telemetry    ASSESSMENT/PLAN:  Principal Problem:    Cellulitis  Resolved Problems:    * No resolved hospital problems.  *    Patient is a 60-year-old female admitted to [place] [address] for  Cellulitis  -Monitor labs  -CRP 12.1, Sed rate 124  -WBC 12.7 > 9.2  -IV ancef  -consult ID  Appreciate ID input-agree with pyoderma gangrenosum diagnosis given patient's underlying inflammatory bowel disease Crohn's disease on previous immunosuppressive therapy  -this wound likely needs to be cleaned - gs consult   Plaquenil  -await wound cultures  -BC pending  -local wound care versus I&D    KARAN on CKD  -Monitor labs  -27/1.7 > 20/1.4  -baseline creatinine 1.1-1.2  -Avoid nephrotoxins, losartan held     Diabetes Mellitus  -Monitor labs  -ISS glucose control  -Hypoglycemia protocol initiated    Medication for other comorbidities continue as appropriate dose adjustment as necessary. DVT prophylaxis Lovenox   PT OT  Discharge planning    Case discussed with attending and agreed upon plan of care. Code status:  Full  Requires Inpatient level of care  ZEINAB Rowe CNP    10:55 AM  7/30/2022     Above note edited to reflect my thoughts     I personally saw, examined and provided care for the patient. Radiographs, labs and medication list were reviewed by me independently. The case was discussed in detail and plans for care were established. Review of WALDEMAR Rowe   , documentation was conducted and revisions were made as appropriate directly by me. I agree with the above documented exam, problem list, and plan of care.      Beth Burgess MD  10:12 PM  7/30/2022

## 2022-07-31 LAB
ANION GAP SERPL CALCULATED.3IONS-SCNC: 8 MMOL/L (ref 7–16)
BASOPHILS ABSOLUTE: 0.02 E9/L (ref 0–0.2)
BASOPHILS RELATIVE PERCENT: 0.2 % (ref 0–2)
BUN BLDV-MCNC: 16 MG/DL (ref 6–23)
CALCIUM SERPL-MCNC: 9.8 MG/DL (ref 8.6–10.2)
CHLORIDE BLD-SCNC: 105 MMOL/L (ref 98–107)
CO2: 20 MMOL/L (ref 22–29)
CREAT SERPL-MCNC: 1.3 MG/DL (ref 0.5–1)
EOSINOPHILS ABSOLUTE: 0.23 E9/L (ref 0.05–0.5)
EOSINOPHILS RELATIVE PERCENT: 2.5 % (ref 0–6)
GFR AFRICAN AMERICAN: 49
GFR NON-AFRICAN AMERICAN: 41 ML/MIN/1.73
GLUCOSE BLD-MCNC: 186 MG/DL (ref 74–99)
HCT VFR BLD CALC: 24.3 % (ref 34–48)
HEMOGLOBIN: 7.5 G/DL (ref 11.5–15.5)
IMMATURE GRANULOCYTES #: 0.07 E9/L
IMMATURE GRANULOCYTES %: 0.8 % (ref 0–5)
LYMPHOCYTES ABSOLUTE: 1.64 E9/L (ref 1.5–4)
LYMPHOCYTES RELATIVE PERCENT: 18.1 % (ref 20–42)
MCH RBC QN AUTO: 28.1 PG (ref 26–35)
MCHC RBC AUTO-ENTMCNC: 30.9 % (ref 32–34.5)
MCV RBC AUTO: 91 FL (ref 80–99.9)
METER GLUCOSE: 142 MG/DL (ref 74–99)
METER GLUCOSE: 203 MG/DL (ref 74–99)
METER GLUCOSE: 204 MG/DL (ref 74–99)
METER GLUCOSE: 248 MG/DL (ref 74–99)
MONOCYTES ABSOLUTE: 0.69 E9/L (ref 0.1–0.95)
MONOCYTES RELATIVE PERCENT: 7.6 % (ref 2–12)
NEUTROPHILS ABSOLUTE: 6.42 E9/L (ref 1.8–7.3)
NEUTROPHILS RELATIVE PERCENT: 70.8 % (ref 43–80)
PDW BLD-RTO: 14 FL (ref 11.5–15)
PLATELET # BLD: 327 E9/L (ref 130–450)
PMV BLD AUTO: 9.4 FL (ref 7–12)
POTASSIUM REFLEX MAGNESIUM: 4.7 MMOL/L (ref 3.5–5)
RBC # BLD: 2.67 E12/L (ref 3.5–5.5)
SODIUM BLD-SCNC: 133 MMOL/L (ref 132–146)
WBC # BLD: 9.1 E9/L (ref 4.5–11.5)
WOUND/ABSCESS: NORMAL

## 2022-07-31 PROCEDURE — 6370000000 HC RX 637 (ALT 250 FOR IP): Performed by: INTERNAL MEDICINE

## 2022-07-31 PROCEDURE — 6370000000 HC RX 637 (ALT 250 FOR IP): Performed by: SPECIALIST

## 2022-07-31 PROCEDURE — 85025 COMPLETE CBC W/AUTO DIFF WBC: CPT

## 2022-07-31 PROCEDURE — 6370000000 HC RX 637 (ALT 250 FOR IP): Performed by: NURSE PRACTITIONER

## 2022-07-31 PROCEDURE — 2580000003 HC RX 258: Performed by: NURSE PRACTITIONER

## 2022-07-31 PROCEDURE — 80048 BASIC METABOLIC PNL TOTAL CA: CPT

## 2022-07-31 PROCEDURE — 36415 COLL VENOUS BLD VENIPUNCTURE: CPT

## 2022-07-31 PROCEDURE — 6360000002 HC RX W HCPCS: Performed by: NURSE PRACTITIONER

## 2022-07-31 PROCEDURE — 1200000000 HC SEMI PRIVATE

## 2022-07-31 PROCEDURE — 82962 GLUCOSE BLOOD TEST: CPT

## 2022-07-31 RX ORDER — PANTOPRAZOLE SODIUM 40 MG/1
40 TABLET, DELAYED RELEASE ORAL
Status: DISCONTINUED | OUTPATIENT
Start: 2022-07-31 | End: 2022-08-02 | Stop reason: HOSPADM

## 2022-07-31 RX ORDER — PREDNISONE 20 MG/1
40 TABLET ORAL 2 TIMES DAILY
Status: DISCONTINUED | OUTPATIENT
Start: 2022-07-31 | End: 2022-08-02 | Stop reason: HOSPADM

## 2022-07-31 RX ADMIN — IBUPROFEN 400 MG: 800 TABLET, FILM COATED ORAL at 05:58

## 2022-07-31 RX ADMIN — WATER 1000 MG: 1 INJECTION INTRAMUSCULAR; INTRAVENOUS; SUBCUTANEOUS at 13:59

## 2022-07-31 RX ADMIN — SODIUM CHLORIDE, PRESERVATIVE FREE 10 ML: 5 INJECTION INTRAVENOUS at 08:24

## 2022-07-31 RX ADMIN — IBUPROFEN 400 MG: 800 TABLET, FILM COATED ORAL at 13:58

## 2022-07-31 RX ADMIN — ALLOPURINOL 300 MG: 300 TABLET ORAL at 08:24

## 2022-07-31 RX ADMIN — WATER 1000 MG: 1 INJECTION INTRAMUSCULAR; INTRAVENOUS; SUBCUTANEOUS at 20:08

## 2022-07-31 RX ADMIN — ENOXAPARIN SODIUM 40 MG: 100 INJECTION SUBCUTANEOUS at 08:24

## 2022-07-31 RX ADMIN — DOXYCYCLINE HYCLATE 100 MG: 100 CAPSULE ORAL at 20:07

## 2022-07-31 RX ADMIN — GABAPENTIN 100 MG: 100 CAPSULE ORAL at 08:24

## 2022-07-31 RX ADMIN — ATORVASTATIN CALCIUM 40 MG: 40 TABLET, FILM COATED ORAL at 08:24

## 2022-07-31 RX ADMIN — PANTOPRAZOLE SODIUM 40 MG: 40 TABLET, DELAYED RELEASE ORAL at 10:47

## 2022-07-31 RX ADMIN — FERROUS SULFATE TAB 325 MG (65 MG ELEMENTAL FE) 325 MG: 325 (65 FE) TAB at 08:24

## 2022-07-31 RX ADMIN — INSULIN LISPRO 2 UNITS: 100 INJECTION, SOLUTION INTRAVENOUS; SUBCUTANEOUS at 15:47

## 2022-07-31 RX ADMIN — IBUPROFEN 400 MG: 800 TABLET, FILM COATED ORAL at 20:07

## 2022-07-31 RX ADMIN — DOXYCYCLINE HYCLATE 100 MG: 100 CAPSULE ORAL at 08:24

## 2022-07-31 RX ADMIN — INSULIN LISPRO 2 UNITS: 100 INJECTION, SOLUTION INTRAVENOUS; SUBCUTANEOUS at 10:51

## 2022-07-31 RX ADMIN — WATER 1000 MG: 1 INJECTION INTRAMUSCULAR; INTRAVENOUS; SUBCUTANEOUS at 05:58

## 2022-07-31 RX ADMIN — PREDNISONE 40 MG: 20 TABLET ORAL at 20:20

## 2022-07-31 RX ADMIN — ASPIRIN 81 MG CHEWABLE TABLET 81 MG: 81 TABLET CHEWABLE at 08:24

## 2022-07-31 ASSESSMENT — PAIN - FUNCTIONAL ASSESSMENT: PAIN_FUNCTIONAL_ASSESSMENT: ACTIVITIES ARE NOT PREVENTED

## 2022-07-31 ASSESSMENT — PAIN DESCRIPTION - LOCATION
LOCATION: ANKLE;LEG
LOCATION: FOOT;ANKLE

## 2022-07-31 ASSESSMENT — PAIN SCALES - GENERAL
PAINLEVEL_OUTOF10: 0
PAINLEVEL_OUTOF10: 4
PAINLEVEL_OUTOF10: 4
PAINLEVEL_OUTOF10: 0

## 2022-07-31 ASSESSMENT — PAIN DESCRIPTION - PAIN TYPE: TYPE: ACUTE PAIN

## 2022-07-31 ASSESSMENT — PAIN DESCRIPTION - DESCRIPTORS
DESCRIPTORS: BURNING
DESCRIPTORS: BURNING

## 2022-07-31 ASSESSMENT — PAIN DESCRIPTION - ORIENTATION
ORIENTATION: LEFT
ORIENTATION: LEFT

## 2022-07-31 NOTE — PROGRESS NOTES
[address] [place] [address] [facility]  Progress Note    SUBJECTIVE:  Chief Complaint   Patient presents with    Wound Infection     Patient sent in by Dr Graham Frausto for wound infection left ankle. Patient is tolerating medications. No reported adverse drug reactions. No nausea, vomiting, diarrhea. Patient up to the bathroom    Review of systems:  As stated above in the chief complaint, otherwise negative. Medications:  Scheduled Meds:   pantoprazole  40 mg Oral QAM AC    doxycycline hyclate  100 mg Oral 2 times per day    gabapentin  100 mg Oral TID    allopurinol  300 mg Oral Daily    aspirin  81 mg Oral Daily    atorvastatin  40 mg Oral Daily    ferrous sulfate  325 mg Oral Daily with breakfast    [Held by provider] losartan-hydroCHLOROthiazide  1 tablet Oral Daily    sodium chloride flush  10 mL IntraVENous 2 times per day    enoxaparin  40 mg SubCUTAneous Daily    ceFAZolin  1,000 mg IntraVENous Q8H    insulin lispro  0-8 Units SubCUTAneous TID WC    insulin lispro  0-4 Units SubCUTAneous Nightly     Continuous Infusions:   sodium chloride 75 mL/hr at 22 0535    sodium chloride      dextrose       PRN Meds:ibuprofen, sodium chloride flush, sodium chloride flush, sodium chloride, ondansetron **OR** ondansetron, magnesium hydroxide, acetaminophen **OR** acetaminophen, glucose, dextrose bolus **OR** dextrose bolus, glucagon (rDNA), dextrose, hydrALAZINE, HYDROcodone 5 mg - acetaminophen    OBJECTIVE:  BP (!) 111/53   Pulse 81   Temp 98.4 °F (36.9 °C) (Oral)   Resp 16   Ht 5' 2\" (1.575 m)   Wt 202 lb (91.6 kg)   SpO2 98%   BMI 36.95 kg/m²   Temp  Av.7 °F (37.1 °C)  Min: 98.4 °F (36.9 °C)  Max: 99 °F (37.2 °C)  Constitutional: The patient is awake, alert, and oriented. Skin: Warm and dry. No rashes were noted. HEENT: Round and reactive pupils. Moist mucous membranes. No ulcerations or thrush. Neck: Supple to movements. Chest: No use of accessory muscles to breathe. Symmetrical expansion. No wheezing, crackles or rhonchi. Cardiovascular: S1 and S2 are rhythmic and regular. No murmurs appreciated. Abdomen: Positive bowel sounds to auscultation. Benign to palpation. No masses felt. No hepatosplenomegaly. Extremities: No clubbing, no cyanosis, no edema. Left lower extremity dressed.   Previous pictures reviewed surrounding erythema seems controlled  Lines: peripheral    Laboratory and Tests Review:  Lab Results   Component Value Date    WBC 9.1 07/31/2022    WBC 9.2 07/30/2022    WBC 12.7 (H) 07/29/2022    HGB 7.5 (L) 07/31/2022    HCT 24.3 (L) 07/31/2022    MCV 91.0 07/31/2022     07/31/2022     Lab Results   Component Value Date    NEUTROABS 6.42 07/31/2022    NEUTROABS 6.94 07/30/2022    NEUTROABS 10.52 (H) 07/29/2022     No results found for: [facility]  Lab Results   Component Value Date    ALT 16 07/29/2022    AST 14 07/29/2022    ALKPHOS 93 07/29/2022    BILITOT 0.2 07/29/2022     Lab Results   Component Value Date/Time     07/31/2022 02:39 AM    K 4.7 07/31/2022 02:39 AM     07/31/2022 02:39 AM    CO2 20 07/31/2022 02:39 AM    BUN 16 07/31/2022 02:39 AM    CREATININE 1.3 07/31/2022 02:39 AM    CREATININE 1.4 07/30/2022 02:11 AM    CREATININE 1.7 07/29/2022 12:05 PM    GFRAA 49 07/31/2022 02:39 AM    LABGLOM 41 07/31/2022 02:39 AM    GLUCOSE 186 07/31/2022 02:39 AM    PROT 7.7 07/29/2022 12:05 PM    LABALBU 3.5 07/29/2022 12:05 PM    CALCIUM 9.8 07/31/2022 02:39 AM    BILITOT 0.2 07/29/2022 12:05 PM    ALKPHOS 93 07/29/2022 12:05 PM    AST 14 07/29/2022 12:05 PM    ALT 16 07/29/2022 12:05 PM     Lab Results   Component Value Date    CRP 13.3 (H) 07/30/2022    CRP 12.1 (H) 07/29/2022    CRP 1.5 (H) 10/16/2020     Lab Results   Component Value Date    SEDRATE 125 (H) 07/30/2022    SEDRATE 124 (H) 07/29/2022    SEDRATE 59 (H) 10/16/2020     Radiology:      Microbiology:   Lab Results   Component Value Date/Time    BC 24 Hours no growth 07/29/2022 12:39 PM Lab Results   Component Value Date/Time    BLOODCULT2 24 Hours no growth 07/29/2022 12:05 PM     WOUND/ABSCESS   Date Value Ref Range Status   07/29/2022 Growth present, evaluating for:  Corynebacteria    Preliminary     No results found for: RESPSMEAR  No results found for: MPNEUMO, CLAMYDCU, LABLEGI, AFBCX, FUNGSM, LABFUNG  No results found for: CULTRESP  No results found for: CXCATHTIP  No results found for: BFCS  No results found for: CXSURG  Urine Culture, Routine   Date Value Ref Range Status   02/15/2018   Final    ,000 CFU/mL  Mixed carmelita isolated. Further workup and sensitivity testing  is not routinely indicated and will not be performed. Mixed carmelita isolated includes:  Mixed gram positive organisms  Gram negative rods     01/20/2018 <10,000 CFU/mL  Mixed gram positive cocci    Final   08/11/2016 <10,000 CFU/mL  Mixed gram positive organisms    Final     No results found for: [address] [facility]  Recent Labs     07/29/22  1205   PROCAL 0.11*     Culture sent  Wound culture corynebacterium thus far    Assessment:  Skin lesion left ankle. This looks like pyoderma gangrenosum. It may have present this as a cellulitis. It is unusual that she would get this kind of reaction 10 months after the tattoo was done. She was on biologicals up until November 2021. My guess is that the biologicals were preventing this from happening. Once she started rejecting the ink, she may have developed this condition. There is some surrounding erythema and possibly cellulitis. A culture was taken but we are not privy to the results  Possible cellulitis. It did not respond to Ciprofloxacin  Leukocytosis-improved     Plan:    Continue  Doxycycline  Continue Cefazolin for now  Skin biopsy  Consider starting steroids  Check cultures, baseline ESR, CRP, ASO  Monitor labs  Will follow with you    ZEINAB Blake  10:06 AM  7/31/2022     Patient seen and examined. I had a face to face encounter with the patient.  Agree with exam.  Assessment and plan as outlined above and directed by me. Addition and corrections were done as deemed appropriate. My exam and plan include: Patient is doing well with current antibiotics. Awaiting for biopsy. Cultures growing Corynebacterium. Continue Doxycycline and Cefazolin for now.     Sarah Escalante MD  7/31/2022  1:05 PM

## 2022-07-31 NOTE — PROGRESS NOTES
[facility]                                Progress note    Subjective: The patient is awake and alert. Resting more comfortably after initiation of gabapentin    Objective:    BP (!) 129/58   Pulse 96   Temp 98 °F (36.7 °C) (Temporal)   Resp 16   Ht 5' 2\" (1.575 m)   Wt 202 lb (91.6 kg)   SpO2 99%   BMI 36.95 kg/m²     In: 2626.1 [P.O.:900; I.V.:1726.1]  Out: -   In: 2626.1   Out: -     General appearance: NAD, conversant  HEENT: AT/NC, MMM  Neck: FROM, supple  Lungs: Clear to auscultation  CV: RRR, no MRGs  Vasc: Radial pulses 2+  Abdomen: Soft, non-tender; no masses or HSM  Extremities: No peripheral edema or digital cyanosis  Skin: Left leg wound  Psych: Alert and oriented to person, place and time  Neuro: Alert and interactive     Recent Labs     07/29/22  1205 07/30/22  0211 07/31/22  0239   WBC 12.7* 9.2 9.1   HGB 9.2* 8.2* 7.5*   HCT 29.6* 26.6* 24.3*    355 327       Recent Labs     07/29/22  1205 07/30/22  0211 07/31/22  0239    133 133   K 3.9 4.2 4.7    103 105   CO2 19* 21* 20*   BUN 27* 20 16   CREATININE 1.7* 1.4* 1.3*   CALCIUM 10.9* 9.8 9.8       Assessment:    Principal Problem:    Cellulitis  Resolved Problems:    * No resolved hospital problems.  *      Plan:  Patient is a 15-year-old female admitted to [place] [address] for  Cellulitis  -Monitor labs  -CRP 12.1, Sed rate 124  -WBC 12.7 > 9.2 > 9.1  -IV ancef  -consult ID  -Appreciate ID input-agree with pyoderma gangrenosum diagnosis given patient's underlying inflammatory bowel disease Crohn's disease on previous immunosuppressive therapy  -General surgery following   -Would defer patient's inflammatory bowel disease management to [facility] as she is known well to them there and recently had a seton drain placed that goes through rectum and vagina for ongoing drainage of secretions/pus  -Plaquenil  -await wound cultures  -BC pending  -local wound care versus I&D  -Initiate steroids ~1 mg/kg p.o. prednisone-40 mg p.o. twice daily-would continue for at least 10 days , follow-up with dermatology as outpatient     KARAN on CKD  -Monitor labs  -27/1.7 > 20/1.4 > 16/1.3 today   -baseline creatinine 1.1-1.2  -Avoid nephrotoxins, losartan held      Diabetes Mellitus  -Monitor labs  -ISS glucose control  -Hypoglycemia protocol initiated     DVT Prophylaxis Lovenox  PT/OT  Discharge planning       ZEINAB Miranda CNP  4:01 PM  7/31/2022     Above note edited to reflect my thoughts     I personally saw, examined and provided care for the patient. Radiographs, labs and medication list were reviewed by me independently. The case was discussed in detail and plans for care were established. Review of WALDEMAR Miranda   , documentation was conducted and revisions were made as appropriate directly by me. I agree with the above documented exam, problem list, and plan of care.      Mando Churchill MD  8:16 PM  7/31/2022

## 2022-07-31 NOTE — PLAN OF CARE

## 2022-07-31 NOTE — PLAN OF CARE
Problem: Discharge Planning  Goal: Discharge to home or other facility with appropriate resources  Outcome: Progressing     Problem: Pain  Goal: Verbalizes/displays adequate comfort level or baseline comfort level  7/30/2022 2217 by Danielle Chapman RN  Outcome: Progressing  7/30/2022 0922 by Kristen Leung RN  Outcome: Progressing     Problem: Chronic Conditions and Co-morbidities  Goal: Patient's chronic conditions and co-morbidity symptoms are monitored and maintained or improved  7/30/2022 2217 by Danielle Chapman RN  Outcome: Progressing  7/30/2022 0922 by Kristen Leung RN  Outcome: Progressing     Problem: Skin/Tissue Integrity  Goal: Absence of new skin breakdown  Description: 1. Monitor for areas of redness and/or skin breakdown  2. Assess vascular access sites hourly  3. Every 4-6 hours minimum:  Change oxygen saturation probe site  4. Every 4-6 hours:  If on nasal continuous positive airway pressure, respiratory therapy assess nares and determine need for appliance change or resting period.   7/30/2022 2217 by Danielle Chapman RN  Outcome: Progressing  7/30/2022 7931 by Kristen Leung RN  Outcome: Progressing

## 2022-07-31 NOTE — CONSULTS
Consultation    Patient's Name/Date of Birth: Kt Joe / 1955    Date: July 31, 2022     PCP: Herman Victor MD     Reason for consultation:    Wound involving the left lower extremity    History of Present Illness: The patient is a 80-year-old white female with known Crohn's disease who presents with a wound involving the left anterolateral ankle. The patient states that the area has been present for few weeks. The patient denies any other significant symptomatology. Past Medical History:   Diagnosis Date    Arthritis     Depression     Diabetes mellitus ([address]     Hyperlipidemia     Hypertension     Ulcerative colitis ([facility] [address]       Past Surgical History:   Procedure Laterality Date    CARPAL TUNNEL RELEASE      CHOLECYSTECTOMY      COLECTOMY      COLONOSCOPY      KNEE ARTHROSCOPY      HI EXCIS RECTAL LESION N/A 2/13/2019    EXAMINE UNDER ANESTHESIA WITH RECTAL INCISION AND DRAINAGE performed by Ronald Mao MD at [place] [address] ENDOSCOPY        Allergies: Patient has no known allergies.      Current Facility-Administered Medications   Medication Dose Route Frequency Provider Last Rate Last Admin    ibuprofen (ADVIL;MOTRIN) tablet 400 mg  400 mg Oral Q8H PRN Eusebio Printers, APRN - CNP   400 mg at 07/31/22 8426    doxycycline hyclate (VIBRAMYCIN) capsule 100 mg  100 mg Oral 2 times per day Sarah Escalante MD   100 mg at 07/31/22 0824    gabapentin (NEURONTIN) capsule 100 mg  100 mg Oral TID Eusebio Printers, APRN - CNP   100 mg at 07/31/22 0824    sodium chloride flush 0.9 % injection 10 mL  10 mL IntraVENous PRN Tyler Wilson DO        allopurinol (ZYLOPRIM) tablet 300 mg  300 mg Oral Daily Eusebio Printers, APRN - CNP   300 mg at 07/31/22 9763    aspirin chewable tablet 81 mg  81 mg Oral Daily Eusebio Printers, APRN - CNP   81 mg at 07/31/22 0824    atorvastatin (LIPITOR) tablet 40 mg  40 mg Oral Daily Eusebio Printers, APRN - CNP   40 mg at 07/31/22 0824    ferrous sulfate (IRON 325) tablet 325 mg  325 mg Oral Daily with breakfast ZEINAB Miranda CNP   325 mg at 07/31/22 0256    [Held by provider] losartan-hydroCHLOROthiazide (HYZAAR) 50-12.5 MG per tablet 1 tablet  1 tablet Oral Daily ZEINAB Miranda - CNP        0.9 % sodium chloride infusion   IntraVENous Continuous ZEINAB Miranda - CNP 75 mL/hr at 07/31/22 0535 Rate Verify at 07/31/22 0535    sodium chloride flush 0.9 % injection 10 mL  10 mL IntraVENous 2 times per day ZEINAB Miranda - CNP   10 mL at 07/31/22 0824    sodium chloride flush 0.9 % injection 10 mL  10 mL IntraVENous PRN ZEINAB Miranda - CNP        0.9 % sodium chloride infusion   IntraVENous PRN ZEINAB Miranda - CNP        enoxaparin (LOVENOX) injection 40 mg  40 mg SubCUTAneous Daily ZEINAB Miranda - CNP   40 mg at 07/31/22 0824    ondansetron (ZOFRAN-ODT) disintegrating tablet 4 mg  4 mg Oral Q8H PRN ZEINAB Miranda CNP        Or    ondansetron (ZOFRAN) injection 4 mg  4 mg IntraVENous Q6H PRN ZEINAB Miranda CNP        magnesium hydroxide (MILK OF MAGNESIA) 400 MG/5ML suspension 30 mL  30 mL Oral Daily PRN ZEINAB Miranda CNP        acetaminophen (TYLENOL) tablet 650 mg  650 mg Oral Q6H PRN ZEINAB Miranda - CNP   650 mg at 07/30/22 0216    Or    acetaminophen (TYLENOL) suppository 650 mg  650 mg Rectal Q6H PRN ZEINAB Miranda CNP        glucose chewable tablet 16 g  4 tablet Oral PRN ZEINAB Miranda - CNP        dextrose bolus 10% 125 mL  125 mL IntraVENous PRN ZEINAB Miranda - CNP        Or    dextrose bolus 10% 250 mL  250 mL IntraVENous PRN ZEINAB Miranda - CNP        glucagon (rDNA) injection 1 mg  1 mg SubCUTAneous PRN ZEINAB Miranda - CNP        dextrose 10 % infusion   IntraVENous Continuous PRN ZEINAB Miranda CNP        ceFAZolin (ANCEF) 1,000 mg in sterile water 10 mL IV syringe  1,000 mg IntraVENous Q8H Freddie Couch, APRN - CNP   1,000 mg at 22 0558    insulin lispro (HUMALOG) injection vial 0-8 Units  0-8 Units SubCUTAneous TID WC Freddie Couch, APRN - CNP   2 Units at 22 1121    insulin lispro (HUMALOG) injection vial 0-4 Units  0-4 Units SubCUTAneous Nightly Freddie Domínguezakilah, APRN - CNP        hydrALAZINE (APRESOLINE) injection 5 mg  5 mg IntraVENous Q6H PRN Freddie Shove, APRN - CNP        HYDROcodone-acetaminophen (NORCO) 5-325 MG per tablet 1 tablet  1 tablet Oral Q6H PRN Freddie Shove, APRN - CNP   1 tablet at 22 1352       Social History     Tobacco Use    Smoking status: Former     Packs/day: 20.00     Years: 0.50     Pack years: 10.00     Types: Cigarettes     Quit date:      Years since quittin.6    Smokeless tobacco: Never   Substance Use Topics    Alcohol use: No        Labs:  Lab Results   Component Value Date    WBC 9.1 2022    HCT 24.3 (L) 2022    HGB 7.5 (L) 2022     2022      Lab Results   Component Value Date     2022    K 4.7 2022     2022    CO2 20 (L) 2022    BUN 16 2022    CREATININE 1.3 (H) 2022    GLUCOSE 186 (H) 2022       Lab Results   Component Value Date    BILIDIR <0.2 2022    AST 14 2022    ALT 16 2022    ALKPHOS 93 2022    PROT 7.7 2022    LIPASE 46 02/15/2018        Review of Systems:    Other than stated above in the HPI is negative      Physical exam: BP (!) 111/53   Pulse 81   Temp 98.4 °F (36.9 °C) (Oral)   Resp 16   Ht 5' 2\" (1.575 m)   Wt 202 lb (91.6 kg)   SpO2 98%   BMI 36.95 kg/m²     General appearance: no acute distress  Head: NCAT, PERRLA, EOMI  Neck: supple, no masses  Lungs: CTABL  Heart: RRR  Abdomen: soft, nondistended, non-tender, normotympanic, no guarding, no peritoneal signs.   Extremities: Open wound measuring approximately 7 cm in diameter with some associated erythema and tenderness    Assessment:    Likely, pyoderma gangrenosum    Plan:    Continue antibiotics  Will discuss timing of biopsy and appropriate treatment.     Zak Alcantara MD,MD 7/31/2022 at 8:27 AM

## 2022-08-01 LAB
ANION GAP SERPL CALCULATED.3IONS-SCNC: 10 MMOL/L (ref 7–16)
BASOPHILS ABSOLUTE: 0.01 E9/L (ref 0–0.2)
BASOPHILS RELATIVE PERCENT: 0.1 % (ref 0–2)
BUN BLDV-MCNC: 18 MG/DL (ref 6–23)
CALCIUM SERPL-MCNC: 10.2 MG/DL (ref 8.6–10.2)
CHLORIDE BLD-SCNC: 106 MMOL/L (ref 98–107)
CO2: 18 MMOL/L (ref 22–29)
CREAT SERPL-MCNC: 1.1 MG/DL (ref 0.5–1)
EOSINOPHILS ABSOLUTE: 0.01 E9/L (ref 0.05–0.5)
EOSINOPHILS RELATIVE PERCENT: 0.1 % (ref 0–6)
GFR AFRICAN AMERICAN: 60
GFR NON-AFRICAN AMERICAN: 49 ML/MIN/1.73
GLUCOSE BLD-MCNC: 305 MG/DL (ref 74–99)
HCT VFR BLD CALC: 25.8 % (ref 34–48)
HEMOGLOBIN: 8 G/DL (ref 11.5–15.5)
IMMATURE GRANULOCYTES #: 0.1 E9/L
IMMATURE GRANULOCYTES %: 1.1 % (ref 0–5)
LYMPHOCYTES ABSOLUTE: 0.71 E9/L (ref 1.5–4)
LYMPHOCYTES RELATIVE PERCENT: 7.6 % (ref 20–42)
MCH RBC QN AUTO: 28.3 PG (ref 26–35)
MCHC RBC AUTO-ENTMCNC: 31 % (ref 32–34.5)
MCV RBC AUTO: 91.2 FL (ref 80–99.9)
METER GLUCOSE: 272 MG/DL (ref 74–99)
METER GLUCOSE: 281 MG/DL (ref 74–99)
METER GLUCOSE: 327 MG/DL (ref 74–99)
METER GLUCOSE: 377 MG/DL (ref 74–99)
MONOCYTES ABSOLUTE: 0.13 E9/L (ref 0.1–0.95)
MONOCYTES RELATIVE PERCENT: 1.4 % (ref 2–12)
NEUTROPHILS ABSOLUTE: 8.37 E9/L (ref 1.8–7.3)
NEUTROPHILS RELATIVE PERCENT: 89.7 % (ref 43–80)
PDW BLD-RTO: 14 FL (ref 11.5–15)
PLATELET # BLD: 320 E9/L (ref 130–450)
PMV BLD AUTO: 9.7 FL (ref 7–12)
POTASSIUM REFLEX MAGNESIUM: 5.3 MMOL/L (ref 3.5–5)
RBC # BLD: 2.83 E12/L (ref 3.5–5.5)
SODIUM BLD-SCNC: 134 MMOL/L (ref 132–146)
WBC # BLD: 9.3 E9/L (ref 4.5–11.5)

## 2022-08-01 PROCEDURE — 6370000000 HC RX 637 (ALT 250 FOR IP): Performed by: INTERNAL MEDICINE

## 2022-08-01 PROCEDURE — 80048 BASIC METABOLIC PNL TOTAL CA: CPT

## 2022-08-01 PROCEDURE — 6370000000 HC RX 637 (ALT 250 FOR IP): Performed by: NURSE PRACTITIONER

## 2022-08-01 PROCEDURE — 82962 GLUCOSE BLOOD TEST: CPT

## 2022-08-01 PROCEDURE — 36415 COLL VENOUS BLD VENIPUNCTURE: CPT

## 2022-08-01 PROCEDURE — 88312 SPECIAL STAINS GROUP 1: CPT

## 2022-08-01 PROCEDURE — 6370000000 HC RX 637 (ALT 250 FOR IP): Performed by: SPECIALIST

## 2022-08-01 PROCEDURE — 6360000002 HC RX W HCPCS: Performed by: NURSE PRACTITIONER

## 2022-08-01 PROCEDURE — 0HBNXZX EXCISION OF LEFT FOOT SKIN, EXTERNAL APPROACH, DIAGNOSTIC: ICD-10-PCS | Performed by: STUDENT IN AN ORGANIZED HEALTH CARE EDUCATION/TRAINING PROGRAM

## 2022-08-01 PROCEDURE — 2580000003 HC RX 258: Performed by: NURSE PRACTITIONER

## 2022-08-01 PROCEDURE — 1200000000 HC SEMI PRIVATE

## 2022-08-01 PROCEDURE — 85025 COMPLETE CBC W/AUTO DIFF WBC: CPT

## 2022-08-01 PROCEDURE — 88305 TISSUE EXAM BY PATHOLOGIST: CPT

## 2022-08-01 RX ORDER — INSULIN GLARGINE 100 [IU]/ML
10 INJECTION, SOLUTION SUBCUTANEOUS NIGHTLY
Status: DISCONTINUED | OUTPATIENT
Start: 2022-08-01 | End: 2022-08-02 | Stop reason: HOSPADM

## 2022-08-01 RX ADMIN — SODIUM CHLORIDE: 9 INJECTION, SOLUTION INTRAVENOUS at 06:19

## 2022-08-01 RX ADMIN — WATER 1000 MG: 1 INJECTION INTRAMUSCULAR; INTRAVENOUS; SUBCUTANEOUS at 06:02

## 2022-08-01 RX ADMIN — HYDROCODONE BITARTRATE AND ACETAMINOPHEN 1 TABLET: 5; 325 TABLET ORAL at 09:14

## 2022-08-01 RX ADMIN — FERROUS SULFATE TAB 325 MG (65 MG ELEMENTAL FE) 325 MG: 325 (65 FE) TAB at 09:16

## 2022-08-01 RX ADMIN — ATORVASTATIN CALCIUM 40 MG: 40 TABLET, FILM COATED ORAL at 09:16

## 2022-08-01 RX ADMIN — HYDROCODONE BITARTRATE AND ACETAMINOPHEN 1 TABLET: 5; 325 TABLET ORAL at 21:13

## 2022-08-01 RX ADMIN — PREDNISONE 40 MG: 20 TABLET ORAL at 09:16

## 2022-08-01 RX ADMIN — SODIUM CHLORIDE, PRESERVATIVE FREE 10 ML: 5 INJECTION INTRAVENOUS at 09:18

## 2022-08-01 RX ADMIN — INSULIN GLARGINE 10 UNITS: 100 INJECTION, SOLUTION SUBCUTANEOUS at 21:16

## 2022-08-01 RX ADMIN — GABAPENTIN 100 MG: 100 CAPSULE ORAL at 09:15

## 2022-08-01 RX ADMIN — DOXYCYCLINE HYCLATE 100 MG: 100 CAPSULE ORAL at 21:13

## 2022-08-01 RX ADMIN — HYDROCODONE BITARTRATE AND ACETAMINOPHEN 1 TABLET: 5; 325 TABLET ORAL at 15:44

## 2022-08-01 RX ADMIN — ASPIRIN 81 MG CHEWABLE TABLET 81 MG: 81 TABLET CHEWABLE at 09:14

## 2022-08-01 RX ADMIN — PANTOPRAZOLE SODIUM 40 MG: 40 TABLET, DELAYED RELEASE ORAL at 05:59

## 2022-08-01 RX ADMIN — INSULIN LISPRO 4 UNITS: 100 INJECTION, SOLUTION INTRAVENOUS; SUBCUTANEOUS at 06:07

## 2022-08-01 RX ADMIN — SODIUM CHLORIDE: 9 INJECTION, SOLUTION INTRAVENOUS at 21:09

## 2022-08-01 RX ADMIN — ALLOPURINOL 300 MG: 300 TABLET ORAL at 09:16

## 2022-08-01 RX ADMIN — INSULIN LISPRO 4 UNITS: 100 INJECTION, SOLUTION INTRAVENOUS; SUBCUTANEOUS at 21:16

## 2022-08-01 RX ADMIN — INSULIN LISPRO 6 UNITS: 100 INJECTION, SOLUTION INTRAVENOUS; SUBCUTANEOUS at 16:23

## 2022-08-01 RX ADMIN — PREDNISONE 40 MG: 20 TABLET ORAL at 16:24

## 2022-08-01 RX ADMIN — GABAPENTIN 100 MG: 100 CAPSULE ORAL at 21:13

## 2022-08-01 RX ADMIN — GABAPENTIN 100 MG: 100 CAPSULE ORAL at 15:44

## 2022-08-01 RX ADMIN — INSULIN LISPRO 4 UNITS: 100 INJECTION, SOLUTION INTRAVENOUS; SUBCUTANEOUS at 11:59

## 2022-08-01 RX ADMIN — DOXYCYCLINE HYCLATE 100 MG: 100 CAPSULE ORAL at 09:14

## 2022-08-01 RX ADMIN — IBUPROFEN 400 MG: 800 TABLET, FILM COATED ORAL at 06:14

## 2022-08-01 ASSESSMENT — PAIN DESCRIPTION - LOCATION
LOCATION: ANKLE
LOCATION: ANKLE
LOCATION: LEG;ANKLE

## 2022-08-01 ASSESSMENT — PAIN DESCRIPTION - DESCRIPTORS
DESCRIPTORS: BURNING
DESCRIPTORS: DISCOMFORT;BURNING
DESCRIPTORS: BURNING

## 2022-08-01 ASSESSMENT — PAIN DESCRIPTION - ORIENTATION
ORIENTATION: LEFT

## 2022-08-01 ASSESSMENT — PAIN SCALES - GENERAL
PAINLEVEL_OUTOF10: 4
PAINLEVEL_OUTOF10: 3
PAINLEVEL_OUTOF10: 6
PAINLEVEL_OUTOF10: 4

## 2022-08-01 ASSESSMENT — PAIN - FUNCTIONAL ASSESSMENT: PAIN_FUNCTIONAL_ASSESSMENT: ACTIVITIES ARE NOT PREVENTED

## 2022-08-01 NOTE — PROGRESS NOTES
[facility]                                Progress note    Subjective: The patient is awake and alert. Resting more comfortably after initiation of gabapentin and p.o. steroids  Indicates she feels much better today      Objective:    BP (!) 120/58   Pulse 86   Temp 98.6 °F (37 °C) (Oral)   Resp 18   Ht 5' 2\" (1.575 m)   Wt 203 lb (92.1 kg)   SpO2 96%   BMI 37.13 kg/m²     In: 2760 [P.O.:1080; I.V.:1680]  Out: -   In: 2760   Out: -     General appearance: NAD, conversant  HEENT: AT/NC, MMM  Neck: FROM, supple  Lungs: Clear to auscultation  CV: RRR, no MRGs  Vasc: Radial pulses 2+  Abdomen: Soft, non-tender; no masses or HSM  Extremities: No peripheral edema or digital cyanosis  Skin: Left leg wound/ulceration/pyoderma gangrenosum lesion site of tattoo  Psych: Alert and oriented to person, place and time  Neuro: Alert and interactive     Recent Labs     07/30/22 0211 07/31/22 0239 08/01/22  0322   WBC 9.2 9.1 9.3   HGB 8.2* 7.5* 8.0*   HCT 26.6* 24.3* 25.8*    327 320       Recent Labs     07/30/22 0211 07/31/22  0239 08/01/22  0322    133 134   K 4.2 4.7 5.3*    105 106   CO2 21* 20* 18*   BUN 20 16 18   CREATININE 1.4* 1.3* 1.1*   CALCIUM 9.8 9.8 10.2       Assessment:    Principal Problem:    Cellulitis  Resolved Problems:    * No resolved hospital problems.  *      Plan:  Patient is a 66-year-old female admitted to [place] [address] for  Cellulitis  -Monitor labs  -CRP 12.1, Sed rate 124  -WBC 12.7 > 9.2 > 9.1  -IV ancef  -consult ID  -Appreciate ID input-agree with pyoderma gangrenosum diagnosis given patient's underlying inflammatory bowel disease Crohn's disease on previous immunosuppressive therapy  -General surgery following   -Would defer patient's inflammatory bowel disease management to [facility] as she is known well to them there and recently had a seton drain placed that goes through rectum and vagina for ongoing drainage of secretions/pus  -Plaquenil  -await wound cultures  -BC pending  -local wound care versus I&D  -Initiate steroids ~1 mg/kg p.o. prednisone-40 mg p.o. twice daily-would continue for at least 10 days , follow-up with dermatology as outpatient-patient indicates site of wound feels much better,     KARAN on CKD  -Monitor labs  -27/1.7 > 20/1.4 > 16/1.3 today   -baseline creatinine 1.1-1.2  -Avoid nephrotoxins, losartan held      Diabetes Mellitus uncontrolled  -Monitor labs  -ISS glucose control-increase  -Hypoglycemia protocol initiated     DVT Prophylaxis Lovenox  PT/OT  Discharge planning       April MD Analisa  7:12 PM  8/1/2022

## 2022-08-01 NOTE — PROGRESS NOTES
[address] [place] [address] [facility]  AMERICOIDA  Progress Note    SUBJECTIVE:  Chief Complaint   Patient presents with    Wound Infection     Patient sent in by Dr Kenneth Cordoba for wound infection left ankle. Patient is tolerating medications. No reported adverse drug reactions. No nausea, vomiting, diarrhea. Says burning in ankle is improved today, started steroids last night   No fevers     Review of systems:  As stated above in the chief complaint, otherwise negative. Medications:  Scheduled Meds:   pantoprazole  40 mg Oral QAM AC    predniSONE  40 mg Oral BID    doxycycline hyclate  100 mg Oral 2 times per day    gabapentin  100 mg Oral TID    allopurinol  300 mg Oral Daily    aspirin  81 mg Oral Daily    atorvastatin  40 mg Oral Daily    ferrous sulfate  325 mg Oral Daily with breakfast    [Held by provider] losartan-hydroCHLOROthiazide  1 tablet Oral Daily    sodium chloride flush  10 mL IntraVENous 2 times per day    enoxaparin  40 mg SubCUTAneous Daily    ceFAZolin  1,000 mg IntraVENous Q8H    insulin lispro  0-8 Units SubCUTAneous TID WC    insulin lispro  0-4 Units SubCUTAneous Nightly     Continuous Infusions:   sodium chloride 75 mL/hr at 22 0619    sodium chloride      dextrose       PRN Meds:ibuprofen, sodium chloride flush, sodium chloride flush, sodium chloride, ondansetron **OR** ondansetron, magnesium hydroxide, acetaminophen **OR** acetaminophen, glucose, dextrose bolus **OR** dextrose bolus, glucagon (rDNA), dextrose, hydrALAZINE, HYDROcodone 5 mg - acetaminophen    OBJECTIVE:  BP (!) 120/58   Pulse 86   Temp 98.6 °F (37 °C) (Oral)   Resp 18   Ht 5' 2\" (1.575 m)   Wt 203 lb (92.1 kg)   SpO2 96%   BMI 37.13 kg/m²   Temp  Av.3 °F (36.8 °C)  Min: 98 °F (36.7 °C)  Max: 98.6 °F (37 °C)  Constitutional: The patient is awake, alert, and oriented. Resting in bed   Skin: Warm and dry. No rashes were noted. HEENT: Round and reactive pupils. Moist mucous membranes.   No ulcerations or thrush. Neck: Supple to movements. Chest: No use of accessory muscles to breathe. Symmetrical expansion. No wheezing, crackles or rhonchi. Cardiovascular: S1 and S2 are rhythmic and regular. No murmurs appreciated. Abdomen: Positive bowel sounds to auscultation. Benign to palpation. No masses felt. Extremities: minimal edema to the left foot. Wound viewed. Surrounding erythema and some tenderness.  Mild serous drainage noted   Lines: peripheral    Laboratory and Tests Review:  Lab Results   Component Value Date    WBC 9.3 08/01/2022    WBC 9.1 07/31/2022    WBC 9.2 07/30/2022    HGB 8.0 (L) 08/01/2022    HCT 25.8 (L) 08/01/2022    MCV 91.2 08/01/2022     08/01/2022     Lab Results   Component Value Date    NEUTROABS 8.37 (H) 08/01/2022    NEUTROABS 6.42 07/31/2022    NEUTROABS 6.94 07/30/2022     No results found for: [facility]  Lab Results   Component Value Date    ALT 16 07/29/2022    AST 14 07/29/2022    ALKPHOS 93 07/29/2022    BILITOT 0.2 07/29/2022     Lab Results   Component Value Date/Time     08/01/2022 03:22 AM    K 5.3 08/01/2022 03:22 AM     08/01/2022 03:22 AM    CO2 18 08/01/2022 03:22 AM    BUN 18 08/01/2022 03:22 AM    CREATININE 1.1 08/01/2022 03:22 AM    CREATININE 1.3 07/31/2022 02:39 AM    CREATININE 1.4 07/30/2022 02:11 AM    GFRAA 60 08/01/2022 03:22 AM    LABGLOM 49 08/01/2022 03:22 AM    GLUCOSE 305 08/01/2022 03:22 AM    PROT 7.7 07/29/2022 12:05 PM    LABALBU 3.5 07/29/2022 12:05 PM    CALCIUM 10.2 08/01/2022 03:22 AM    BILITOT 0.2 07/29/2022 12:05 PM    ALKPHOS 93 07/29/2022 12:05 PM    AST 14 07/29/2022 12:05 PM    ALT 16 07/29/2022 12:05 PM     Lab Results   Component Value Date    CRP 13.3 (H) 07/30/2022    CRP 12.1 (H) 07/29/2022    CRP 1.5 (H) 10/16/2020     Lab Results   Component Value Date    SEDRATE 125 (H) 07/30/2022    SEDRATE 124 (H) 07/29/2022    SEDRATE 59 (H) 10/16/2020     Radiology:  Reviewed     Microbiology:   Wound culture: Corynebacterium  Blood cultures: negative so far     Assessment:  Skin lesion left ankle. This looks like pyoderma gangrenosum. It may have present this as a cellulitis. It is unusual that she would get this kind of reaction 10 months after the tattoo was done. She was on biologicals up until November 2021. My guess is that the biologicals were preventing this from happening. Once she started rejecting the ink, she may have developed this condition. There is some surrounding erythema and possibly cellulitis. A culture was taken but we are not privy to the results  Possible cellulitis. It did not respond to Ciprofloxacin  Leukocytosis-improved     Plan:    Continue Doxycycline & Cefazolin for now  Skin biopsy today   Steroids started 7/31 by hospitalist   Labs and cultures reviewed     ZEINAB Heart CNP  12:14 PM  8/1/2022     Patient seen and examined. I had a face to face encounter with the patient. Agree with exam.  Assessment and plan as outlined above and directed by me. Addition and corrections were done as deemed appropriate. My exam and plan include: The patient began taking steroids yesterday. She will be having a biopsy today. The redness has improved and the pain as improved as well. We will discontinue Cefazolin and continue oral Doxycycline for now.     Delfina Benton MD  8/1/2022  1:01 PM

## 2022-08-01 NOTE — PROCEDURES
Punch Biopsy procedure note    After verbal consent was obtained, 5 cc of 1% Lidocaine with epinephrine was injected in a circumferential fashion around the lesion, with sterile technique 3x 5 mm punch biopsy was used to obtained. Hemostasis was obtained by pressure. Adaptic was placed over the wound, and wound care instructions provided. The specimen was labeled and sent to pathology for evaluation. The procedure was well tolerated without complications.     Irene Baca PGY 2     Dr. Shania Chester was immediately available for all portions of the procedure

## 2022-08-01 NOTE — PLAN OF CARE
Problem: Discharge Planning  Goal: Discharge to home or other facility with appropriate resources  7/31/2022 2131 by Sukhi Dos Santos RN  Outcome: Progressing  7/31/2022 1250 by Alissa Gaspar RN  Outcome: Progressing     Problem: Pain  Goal: Verbalizes/displays adequate comfort level or baseline comfort level  7/31/2022 2131 by Sukhi Dos Santos RN  Outcome: Progressing  7/31/2022 1250 by Alissa Gaspar RN  Outcome: Progressing     Problem: Chronic Conditions and Co-morbidities  Goal: Patient's chronic conditions and co-morbidity symptoms are monitored and maintained or improved  7/31/2022 2131 by Sukhi Dos Santos RN  Outcome: Progressing  7/31/2022 1250 by Alissa Gaspar RN  Outcome: Progressing     Problem: Skin/Tissue Integrity  Goal: Absence of new skin breakdown  Description: 1. Monitor for areas of redness and/or skin breakdown  2. Assess vascular access sites hourly  3. Every 4-6 hours minimum:  Change oxygen saturation probe site  4. Every 4-6 hours:  If on nasal continuous positive airway pressure, respiratory therapy assess nares and determine need for appliance change or resting period.   7/31/2022 2131 by Sukhi Dos Santos RN  Outcome: Progressing  7/31/2022 1250 by Alissa Gaspar RN  Outcome: Progressing     Problem: ABCDS Injury Assessment  Goal: Absence of physical injury  7/31/2022 2131 by Sukhi Dos Santos RN  Outcome: Progressing  7/31/2022 1250 by Alissa Gaspar RN  Outcome: Progressing

## 2022-08-01 NOTE — PROGRESS NOTES
GENERAL SURGERY  DAILY PROGRESS NOTE  8/1/2022    CHIEF COMPLAINT:  Chief Complaint   Patient presents with    Wound Infection     Patient sent in by Dr Janice Simms for wound infection left ankle. SUBJECTIVE:  NAEO. No new complaints. OBJECTIVE:  BP (!) 106/58   Pulse 81   Temp 98.3 °F (36.8 °C) (Oral)   Resp 16   Ht 5' 2\" (1.575 m)   Wt 203 lb (92.1 kg)   SpO2 96%   BMI 37.13 kg/m²     CBC:   Lab Results   Component Value Date/Time    WBC 9.3 08/01/2022 03:22 AM    RBC 2.83 08/01/2022 03:22 AM    HGB 8.0 08/01/2022 03:22 AM    HCT 25.8 08/01/2022 03:22 AM    MCV 91.2 08/01/2022 03:22 AM    MCH 28.3 08/01/2022 03:22 AM    MCHC 31.0 08/01/2022 03:22 AM    RDW 14.0 08/01/2022 03:22 AM     08/01/2022 03:22 AM    MPV 9.7 08/01/2022 03:22 AM     CMP:    Lab Results   Component Value Date/Time     08/01/2022 03:22 AM    K 5.3 08/01/2022 03:22 AM     08/01/2022 03:22 AM    CO2 18 08/01/2022 03:22 AM    BUN 18 08/01/2022 03:22 AM    CREATININE 1.1 08/01/2022 03:22 AM    GFRAA 60 08/01/2022 03:22 AM    LABGLOM 49 08/01/2022 03:22 AM    GLUCOSE 305 08/01/2022 03:22 AM    PROT 7.7 07/29/2022 12:05 PM    LABALBU 3.5 07/29/2022 12:05 PM    CALCIUM 10.2 08/01/2022 03:22 AM    BILITOT 0.2 07/29/2022 12:05 PM    ALKPHOS 93 07/29/2022 12:05 PM    AST 14 07/29/2022 12:05 PM    ALT 16 07/29/2022 12:05 PM        GENERAL:  NAD. A&Ox3. LUNGS:  No increased work of breathing. CARDIOVASCULAR: RR  ABDOMEN:  Soft, non-distended, non-tender. No guarding, rigidity, rebound. EXTREMITIES: Open wound measuring approximately 7 cm in diameter with some associated erythema and tenderness     ASSESSMENT/PLAN:  79 y.o. female with pyoderma gangrenosum    Plan  -continue ancef and vibramycin   -prn pain and nausea control  -will discuss biopsy timing, likely today     Will DW Dr. Cristian Zazueta MD  Surgery Resident PGY-2  8/1/2022  5:02 AM     The patient was seen and examined and the chart was reviewed.   I agree

## 2022-08-01 NOTE — PATIENT CARE CONFERENCE
[facility] Quality Flow/Interdisciplinary Rounds Progress Note        Quality Flow Rounds held on August 1, 2022    Disciplines Attending:  Bedside Nurse, , , and Nursing Unit Leadership    Margarito Hazel was admitted on 7/29/2022 11:06 AM    Anticipated Discharge Date:       Disposition:    Christiano Score:  Christiano Scale Score: 22    Readmission Risk              Risk of Unplanned Readmission:  [id number]           Discussed patient goal for the day, patient clinical progression, and barriers to discharge.   The following Goal(s) of the Day/Commitment(s) have been identified:  Labs - Report Results        Isaak Reza RN  August 1, 2022

## 2022-08-01 NOTE — CARE COORDINATION
Introduced my self and provided explanation of CM role to patient. Patient is awake, alert, and aware of current diagnosis and treatment plan including surgical punch biopsy, ID consult, antibiotic therapy. Patient verbalizes no concerns and identifies no areas to focus on nor barriers to discharge. She resides at home with . She completes her adl's with independence. Patient is established with a pcp. She has NO retail pharmaceutical coverage and generally pays OOP for prescription drugs. She would appreciate any assistance (I.e. vouchers) for new meds if able. She plans on a discharge to home and is aware of ELOS 24-48 hrs. Will follow along with  and assist with discharge planning as necessary. Daily Ibanez.  Aryan, MSN, RN  [facility] Case Management  [phone]

## 2022-08-02 VITALS
WEIGHT: 203 LBS | TEMPERATURE: 98.3 F | RESPIRATION RATE: 16 BRPM | SYSTOLIC BLOOD PRESSURE: 132 MMHG | BODY MASS INDEX: 37.36 KG/M2 | HEART RATE: 50 BPM | OXYGEN SATURATION: 98 % | HEIGHT: 62 IN | DIASTOLIC BLOOD PRESSURE: 62 MMHG

## 2022-08-02 LAB
BASOPHILS ABSOLUTE: 0.02 E9/L (ref 0–0.2)
BASOPHILS RELATIVE PERCENT: 0.2 % (ref 0–2)
EOSINOPHILS ABSOLUTE: 0 E9/L (ref 0.05–0.5)
EOSINOPHILS RELATIVE PERCENT: 0 % (ref 0–6)
HCT VFR BLD CALC: 24.1 % (ref 34–48)
HEMOGLOBIN: 7.5 G/DL (ref 11.5–15.5)
IMMATURE GRANULOCYTES #: 0.3 E9/L
IMMATURE GRANULOCYTES %: 2.6 % (ref 0–5)
LYMPHOCYTES ABSOLUTE: 0.92 E9/L (ref 1.5–4)
LYMPHOCYTES RELATIVE PERCENT: 8.1 % (ref 20–42)
MCH RBC QN AUTO: 28.2 PG (ref 26–35)
MCHC RBC AUTO-ENTMCNC: 31.1 % (ref 32–34.5)
MCV RBC AUTO: 90.6 FL (ref 80–99.9)
METER GLUCOSE: 353 MG/DL (ref 74–99)
METER GLUCOSE: 361 MG/DL (ref 74–99)
METER GLUCOSE: 394 MG/DL (ref 74–99)
MONOCYTES ABSOLUTE: 0.47 E9/L (ref 0.1–0.95)
MONOCYTES RELATIVE PERCENT: 4.1 % (ref 2–12)
NEUTROPHILS ABSOLUTE: 9.69 E9/L (ref 1.8–7.3)
NEUTROPHILS RELATIVE PERCENT: 85 % (ref 43–80)
PDW BLD-RTO: 14.2 FL (ref 11.5–15)
PLATELET # BLD: 371 E9/L (ref 130–450)
PMV BLD AUTO: 10.1 FL (ref 7–12)
RBC # BLD: 2.66 E12/L (ref 3.5–5.5)
WBC # BLD: 11.4 E9/L (ref 4.5–11.5)

## 2022-08-02 PROCEDURE — 85025 COMPLETE CBC W/AUTO DIFF WBC: CPT

## 2022-08-02 PROCEDURE — 6360000002 HC RX W HCPCS: Performed by: NURSE PRACTITIONER

## 2022-08-02 PROCEDURE — 6370000000 HC RX 637 (ALT 250 FOR IP): Performed by: SPECIALIST

## 2022-08-02 PROCEDURE — 6370000000 HC RX 637 (ALT 250 FOR IP): Performed by: NURSE PRACTITIONER

## 2022-08-02 PROCEDURE — 82962 GLUCOSE BLOOD TEST: CPT

## 2022-08-02 PROCEDURE — 36415 COLL VENOUS BLD VENIPUNCTURE: CPT

## 2022-08-02 PROCEDURE — 2580000003 HC RX 258: Performed by: NURSE PRACTITIONER

## 2022-08-02 PROCEDURE — 6370000000 HC RX 637 (ALT 250 FOR IP): Performed by: INTERNAL MEDICINE

## 2022-08-02 RX ORDER — DOXYCYCLINE HYCLATE 100 MG/1
100 CAPSULE ORAL EVERY 12 HOURS SCHEDULED
Qty: 10 CAPSULE | Refills: 0 | Status: SHIPPED | OUTPATIENT
Start: 2022-08-02 | End: 2022-08-07

## 2022-08-02 RX ORDER — PREDNISONE 10 MG/1
TABLET ORAL
Qty: 30 TABLET | Refills: 0 | Status: SHIPPED | OUTPATIENT
Start: 2022-08-02 | End: 2022-08-18

## 2022-08-02 RX ORDER — GABAPENTIN 100 MG/1
100 CAPSULE ORAL 3 TIMES DAILY
Qty: 90 CAPSULE | Refills: 0 | Status: SHIPPED | OUTPATIENT
Start: 2022-08-02 | End: 2022-09-01

## 2022-08-02 RX ADMIN — PREDNISONE 40 MG: 20 TABLET ORAL at 10:16

## 2022-08-02 RX ADMIN — HYDROCODONE BITARTRATE AND ACETAMINOPHEN 1 TABLET: 5; 325 TABLET ORAL at 10:17

## 2022-08-02 RX ADMIN — SODIUM CHLORIDE, PRESERVATIVE FREE 10 ML: 5 INJECTION INTRAVENOUS at 10:17

## 2022-08-02 RX ADMIN — ASPIRIN 81 MG CHEWABLE TABLET 81 MG: 81 TABLET CHEWABLE at 10:16

## 2022-08-02 RX ADMIN — PANTOPRAZOLE SODIUM 40 MG: 40 TABLET, DELAYED RELEASE ORAL at 06:36

## 2022-08-02 RX ADMIN — INSULIN LISPRO 8 UNITS: 100 INJECTION, SOLUTION INTRAVENOUS; SUBCUTANEOUS at 06:50

## 2022-08-02 RX ADMIN — ENOXAPARIN SODIUM 40 MG: 100 INJECTION SUBCUTANEOUS at 10:16

## 2022-08-02 RX ADMIN — ALLOPURINOL 300 MG: 300 TABLET ORAL at 10:17

## 2022-08-02 RX ADMIN — DOXYCYCLINE HYCLATE 100 MG: 100 CAPSULE ORAL at 10:17

## 2022-08-02 RX ADMIN — ATORVASTATIN CALCIUM 40 MG: 40 TABLET, FILM COATED ORAL at 10:17

## 2022-08-02 RX ADMIN — FERROUS SULFATE TAB 325 MG (65 MG ELEMENTAL FE) 325 MG: 325 (65 FE) TAB at 10:17

## 2022-08-02 RX ADMIN — GABAPENTIN 100 MG: 100 CAPSULE ORAL at 10:17

## 2022-08-02 RX ADMIN — GABAPENTIN 100 MG: 100 CAPSULE ORAL at 14:13

## 2022-08-02 ASSESSMENT — PAIN SCALES - GENERAL: PAINLEVEL_OUTOF10: 5

## 2022-08-02 NOTE — PATIENT CARE CONFERENCE
[facility] Quality Flow/Interdisciplinary Rounds Progress Note        Quality Flow Rounds held on August 2, 2022    Disciplines Attending:  Bedside Nurse, , , and Nursing Unit Leadership    Bebeto Llanes was admitted on 7/29/2022 11:06 AM    Anticipated Discharge Date:       Disposition:    Christiano Score:  Christiano Scale Score: 22    Readmission Risk              Risk of Unplanned Readmission:  [id number]           Discussed patient goal for the day, patient clinical progression, and barriers to discharge.   The following Goal(s) of the Day/Commitment(s) have been identified:  Discharge - [address] Sterling Townsend, RN  August 2, 2022

## 2022-08-02 NOTE — PROGRESS NOTES
Hospitalist Progress Note      PCP: Celso Laguna MD    Date of Admission: 7/29/2022        Hospital Course:  had a tattoo about 1 year ago, and now it has started to show signs of infection at the tattoo site. Has been on abx, to go home on doxy.          Subjective:  no complaints          Medications:  Reviewed    Infusion Medications    sodium chloride 75 mL/hr at 08/01/22 2109    sodium chloride      dextrose       Scheduled Medications    insulin glargine  10 Units SubCUTAneous Nightly    pantoprazole  40 mg Oral QAM AC    predniSONE  40 mg Oral BID    doxycycline hyclate  100 mg Oral 2 times per day    gabapentin  100 mg Oral TID    allopurinol  300 mg Oral Daily    aspirin  81 mg Oral Daily    atorvastatin  40 mg Oral Daily    ferrous sulfate  325 mg Oral Daily with breakfast    [Held by provider] losartan-hydroCHLOROthiazide  1 tablet Oral Daily    sodium chloride flush  10 mL IntraVENous 2 times per day    enoxaparin  40 mg SubCUTAneous Daily    insulin lispro  0-8 Units SubCUTAneous TID WC    insulin lispro  0-4 Units SubCUTAneous Nightly     PRN Meds: ibuprofen, sodium chloride flush, sodium chloride flush, sodium chloride, ondansetron **OR** ondansetron, magnesium hydroxide, acetaminophen **OR** acetaminophen, glucose, dextrose bolus **OR** dextrose bolus, glucagon (rDNA), dextrose, hydrALAZINE, HYDROcodone 5 mg - acetaminophen      Intake/Output Summary (Last 24 hours) at 8/2/2022 1358  Last data filed at 8/2/2022 1208  Gross per 24 hour   Intake 900 ml   Output --   Net 900 ml       Exam:    /62   Pulse 50   Temp 98.3 °F (36.8 °C) (Oral)   Resp 16   Ht 5' 2\" (1.575 m)   Wt 203 lb (92.1 kg)   SpO2 98%   BMI 37.13 kg/m²           Gen: well developed  HEENT: NC/AT, moist mucous membranes, no oropharyngeal erythema or exudate  Neck: supple, trachea midline, no anterior cervical or SC LAD  Heart:  Normal s1/s2, RRR,  Lungs:  cta  bilaterally,  Abd: bowel sounds present, soft, nontender, nondistended, no masses  Extrem:  No clubbing, cyanosis,  no edema dsd intact left ankle  Skin: no rashes or lesions  Psych: A & O x3  Neuro: grossly intact, moves all four extremities. Labs:   Recent Labs     07/31/22 0239 08/01/22  0322 08/02/22  0408   WBC 9.1 9.3 11.4   HGB 7.5* 8.0* 7.5*   HCT 24.3* 25.8* 24.1*    320 371     Recent Labs     07/31/22  0239 08/01/22  0322    134   K 4.7 5.3*    106   CO2 20* 18*   BUN 16 18   CREATININE 1.3* 1.1*   CALCIUM 9.8 10.2     No results for input(s): AST, ALT, BILIDIR, BILITOT, ALKPHOS in the last 72 hours. No results for input(s): INR in the last 72 hours. No results for input(s): Gerry Ebbs in the last 72 hours. No results for input(s): AST, ALT, ALB, BILIDIR, BILITOT, ALKPHOS in the last 72 hours. No results for input(s): LACTA in the last 72 hours. Lab Results   Component Value Date    LABURIC 6.1 (H) 04/15/2017     No results found for: AMMONIA    Assessment:    Active Hospital Problems    Diagnosis Date Noted    Cellulitis [L03.90] 07/29/2022     Priority: Medium   ?  Pyoderma gangrenosum   Ii dm   Dheeraj   Ckd 3  Plan:      Dc home on doxy     Electronically signed by Tommie Castro DO on 8/2/2022 at 1:58 PM [facility]

## 2022-08-02 NOTE — PROGRESS NOTES
Called  to notify of patient's blood sugar of 377 per the order. Message left, will wait for a call back or new orders if needed. Received call back. Will recheck patient's blood sugar.     Electronically signed by Luis Scott RN on 8/1/2022 at 10:20 PM

## 2022-08-02 NOTE — PROGRESS NOTES
[address] [place] [address] [facility]  Progress Note    SUBJECTIVE:  Chief Complaint   Patient presents with    Wound Infection     Patient sent in by Dr Graham Frausto for wound infection left ankle. Patient is tolerating medications. No reported adverse drug reactions. No nausea, vomiting, diarrhea. Feeling better today, asking about going home  Said her ankle feels better, the punch biopsy was a little painful     Review of systems:  As stated above in the chief complaint, otherwise negative. Medications:  Scheduled Meds:   insulin glargine  10 Units SubCUTAneous Nightly    pantoprazole  40 mg Oral QAM AC    predniSONE  40 mg Oral BID    doxycycline hyclate  100 mg Oral 2 times per day    gabapentin  100 mg Oral TID    allopurinol  300 mg Oral Daily    aspirin  81 mg Oral Daily    atorvastatin  40 mg Oral Daily    ferrous sulfate  325 mg Oral Daily with breakfast    [Held by provider] losartan-hydroCHLOROthiazide  1 tablet Oral Daily    sodium chloride flush  10 mL IntraVENous 2 times per day    enoxaparin  40 mg SubCUTAneous Daily    insulin lispro  0-8 Units SubCUTAneous TID WC    insulin lispro  0-4 Units SubCUTAneous Nightly     Continuous Infusions:   sodium chloride 75 mL/hr at 22    sodium chloride      dextrose       PRN Meds:ibuprofen, sodium chloride flush, sodium chloride flush, sodium chloride, ondansetron **OR** ondansetron, magnesium hydroxide, acetaminophen **OR** acetaminophen, glucose, dextrose bolus **OR** dextrose bolus, glucagon (rDNA), dextrose, hydrALAZINE, HYDROcodone 5 mg - acetaminophen    OBJECTIVE:  /63   Pulse 59   Temp 97.7 °F (36.5 °C) (Oral)   Resp 16   Ht 5' 2\" (1.575 m)   Wt 203 lb (92.1 kg)   SpO2 99%   BMI 37.13 kg/m²   Temp  Av °F (36.7 °C)  Min: 97.7 °F (36.5 °C)  Max: 98.2 °F (36.8 °C)  Constitutional: The patient is awake, alert, and oriented. In no distress. Skin: Warm and dry. No rashes were noted.    HEENT: Round and reactive pupils. Moist mucous membranes. No ulcerations or thrush. Neck: Supple to movements. Chest: No use of accessory muscles to breathe. Symmetrical expansion. No wheezing, crackles or rhonchi. Cardiovascular: S1 and S2 are rhythmic and regular. No murmurs appreciated. Abdomen: Positive bowel sounds to auscultation. Benign to palpation. No masses felt. Extremities: minimal edema to the left foot.  Erythema is improving around the wound, less drainage noted to dressing  Lines: peripheral    Laboratory and Tests Review:  Lab Results   Component Value Date    WBC 11.4 08/02/2022    WBC 9.3 08/01/2022    WBC 9.1 07/31/2022    HGB 7.5 (L) 08/02/2022    HCT 24.1 (L) 08/02/2022    MCV 90.6 08/02/2022     08/02/2022     Lab Results   Component Value Date    NEUTROABS 9.69 (H) 08/02/2022    NEUTROABS 8.37 (H) 08/01/2022    NEUTROABS 6.42 07/31/2022     No results found for: [facility]  Lab Results   Component Value Date    ALT 16 07/29/2022    AST 14 07/29/2022    ALKPHOS 93 07/29/2022    BILITOT 0.2 07/29/2022     Lab Results   Component Value Date/Time     08/01/2022 03:22 AM    K 5.3 08/01/2022 03:22 AM     08/01/2022 03:22 AM    CO2 18 08/01/2022 03:22 AM    BUN 18 08/01/2022 03:22 AM    CREATININE 1.1 08/01/2022 03:22 AM    CREATININE 1.3 07/31/2022 02:39 AM    CREATININE 1.4 07/30/2022 02:11 AM    GFRAA 60 08/01/2022 03:22 AM    LABGLOM 49 08/01/2022 03:22 AM    GLUCOSE 305 08/01/2022 03:22 AM    PROT 7.7 07/29/2022 12:05 PM    LABALBU 3.5 07/29/2022 12:05 PM    CALCIUM 10.2 08/01/2022 03:22 AM    BILITOT 0.2 07/29/2022 12:05 PM    ALKPHOS 93 07/29/2022 12:05 PM    AST 14 07/29/2022 12:05 PM    ALT 16 07/29/2022 12:05 PM     Lab Results   Component Value Date    CRP 13.3 (H) 07/30/2022    CRP 12.1 (H) 07/29/2022    CRP 1.5 (H) 10/16/2020     Lab Results   Component Value Date    SEDRATE 125 (H) 07/30/2022    SEDRATE 124 (H) 07/29/2022    SEDRATE 59 (H) 10/16/2020     Radiology:  Reviewed Microbiology:   Wound culture: Corynebacterium  Blood cultures: negative so far     Assessment:  Skin lesion left ankle. This looks like pyoderma gangrenosum. It may have present this as a cellulitis. It is unusual that she would get this kind of reaction 10 months after the tattoo was done. She was on biologicals up until November 2021. My guess is that the biologicals were preventing this from happening. Once she started rejecting the ink, she may have developed this condition. There is some surrounding erythema and possibly cellulitis. A culture was taken but we are not privy to the results  Possible cellulitis. It did not respond to Ciprofloxacin  Leukocytosis-improved     Plan:    Continue Doxycycline for a few more days  Skin biopsy results pending   Steroids started 7/31 by hospitalist   Labs and cultures reviewed   [id number] Maria Victoria Townsend to discharge from [address] [place] [address] Miguelitohièvre, APRN - CNP  11:55 AM  8/2/2022     Patient seen and examined. I had a face to face encounter with the patient. Agree with exam.  Assessment and plan as outlined above and directed by me. Addition and corrections were done as deemed appropriate. My exam and plan include: The patient is doing well. She is anxious to go home. Her feet are swelling because she is not getting exercise and is not allowed to shower. She is still on IV fluids, which I do not think she needs any longer. Doxycycline has been reconciled. She is already on steroids and doing well. She can be discharged. Dr. Yancy Cintron can handle the prednisone taper. Spoke with nursing.     Linus Sandhoff, MD  8/2/2022  12:56 PM

## 2022-08-02 NOTE — PROGRESS NOTES
Called  on call provider to notify of patient's blood glucose level of 361, per the humalog order for blood glucose above 349 to give 8 units of humalog and to notify the physician. They are aware, no new orders.     Electronically signed by Chandrika Chong RN on 8/2/2022 at 6:59 AM

## 2022-08-02 NOTE — PROGRESS NOTES
GENERAL SURGERY  DAILY PROGRESS NOTE  8/2/2022    CHIEF COMPLAINT:  Chief Complaint   Patient presents with    Wound Infection     Patient sent in by Dr Janice Simms for wound infection left ankle. SUBJECTIVE:  NAEO. Leg with mild drainage overnight. Pain is controlled. OBJECTIVE:  BP (!) 115/56   Pulse 63   Temp 98.2 °F (36.8 °C) (Oral)   Resp 16   Ht 5' 2\" (1.575 m)   Wt 203 lb (92.1 kg)   SpO2 94%   BMI 37.13 kg/m²     CBC:   Lab Results   Component Value Date/Time    WBC 11.4 08/02/2022 04:08 AM    RBC 2.66 08/02/2022 04:08 AM    HGB 7.5 08/02/2022 04:08 AM    HCT 24.1 08/02/2022 04:08 AM    MCV 90.6 08/02/2022 04:08 AM    MCH 28.2 08/02/2022 04:08 AM    MCHC 31.1 08/02/2022 04:08 AM    RDW 14.2 08/02/2022 04:08 AM     08/02/2022 04:08 AM    MPV 10.1 08/02/2022 04:08 AM     CMP:    Lab Results   Component Value Date/Time     08/01/2022 03:22 AM    K 5.3 08/01/2022 03:22 AM     08/01/2022 03:22 AM    CO2 18 08/01/2022 03:22 AM    BUN 18 08/01/2022 03:22 AM    CREATININE 1.1 08/01/2022 03:22 AM    GFRAA 60 08/01/2022 03:22 AM    LABGLOM 49 08/01/2022 03:22 AM    GLUCOSE 305 08/01/2022 03:22 AM    PROT 7.7 07/29/2022 12:05 PM    LABALBU 3.5 07/29/2022 12:05 PM    CALCIUM 10.2 08/01/2022 03:22 AM    BILITOT 0.2 07/29/2022 12:05 PM    ALKPHOS 93 07/29/2022 12:05 PM    AST 14 07/29/2022 12:05 PM    ALT 16 07/29/2022 12:05 PM        GENERAL:  NAD. A&Ox3. LUNGS:  No increased work of breathing. CARDIOVASCULAR: RR  ABDOMEN:  Soft, non-distended, non-tender. No guarding, rigidity, rebound. EXTREMITIES: Open wound measuring approximately 7 cm in diameter with some associated erythema and tenderness     ASSESSMENT/PLAN:  79 y.o. female with suspected pyoderma gangrenosum    Plan  -abx per ID team appreciate input  -fu punch bx results     Will DW Dr. Cristian Zazueta MD  Surgery Resident PGY-2  8/2/2022  6:40 AM     The patient was seen and examined and the chart was reviewed.   I agree with the assessment and plan. The final pathology is pending. The working diagnosis is pyoderma gangrenosum. The patient states that her symptoms have improved on steroids.

## 2022-08-02 NOTE — PROGRESS NOTES
CLINICAL PHARMACY NOTE: MEDS TO BEDS    Total # of Prescriptions Filled: 3   The following medications were delivered to the patient:  Doxycycline 100 caps  Prednisone 10  Gabapentin 100    Additional Documentation:

## 2022-08-02 NOTE — PLAN OF CARE
Problem: Discharge Planning  Goal: Discharge to home or other facility with appropriate resources  Outcome: Completed     Problem: Pain  Goal: Verbalizes/displays adequate comfort level or baseline comfort level  Outcome: Completed     Problem: Chronic Conditions and Co-morbidities  Goal: Patient's chronic conditions and co-morbidity symptoms are monitored and maintained or improved  Outcome: Completed     Problem: Skin/Tissue Integrity  Goal: Absence of new skin breakdown  Description: 1. Monitor for areas of redness and/or skin breakdown  2. Assess vascular access sites hourly  3. Every 4-6 hours minimum:  Change oxygen saturation probe site  4. Every 4-6 hours:  If on nasal continuous positive airway pressure, respiratory therapy assess nares and determine need for appliance change or resting period.   Outcome: Completed     Problem: ABCDS Injury Assessment  Goal: Absence of physical injury  Outcome: Completed

## 2022-08-02 NOTE — PROGRESS NOTES
Patient with loop ostomy done at [facility]. Last year  Is independent with care  Tattoo area left lateral leg wound    Adapatic, opticel, stratasorb applied  Soft slough distal area and some to right. Per patient looks better, edges of skin macerated from drainage. Per Dr notes. Follow up with dermatology and Dr. Apple Junior.  Vitaliy Helton, CNS, Wound Care

## 2022-08-03 LAB
BLOOD CULTURE, ROUTINE: NORMAL
CULTURE, BLOOD 2: NORMAL

## 2022-08-03 NOTE — PROGRESS NOTES
Physician Progress Note      PATIENT:               Roel Valdez  CSN #:                  [id number]  :                       1955  ADMIT DATE:       2022 11:06 AM  DISCH DATE:        2022 3:28 PM  RESPONDING  PROVIDER #:        Vinny DOYLE DO          QUERY TEXT:    Dr. Ynes Humphrey,    Patient admitted with left ankle cellulitis and noted to have DM type 2. If   possible, please document in progress notes and discharge summary the   relationship, if any, between cellulitis and DM. The medical record reflects the following:  Risk Factors: DM type 2, cellulitis left ankle  Clinical Indicators: DM type 2 with hyperglycemia and cellulitis of the left   ankle  Treatment: IV ancef, ID consult, wound cultures    Thank you,  Leslye Galo RN  Clinical Documentation Improvement  [phone]  Options provided:  -- Left ankle cellulitis associated with Diabetes  -- Left ankle cellulitis unrelated to Diabetes  -- Other - I will add my own diagnosis  -- Disagree - Not applicable / Not valid  -- Disagree - Clinically unable to determine / Unknown  -- Refer to Clinical Documentation Reviewer    PROVIDER RESPONSE TEXT:    Left ankle cellulitis associated with Diabetes.     Query created by: Jozef Martinez on 2022 12:11 PM      Electronically signed by:  Nash Garcia DO 8/3/2022 4:59 PM

## 2022-08-10 NOTE — DISCHARGE SUMMARY
Hospitalist Discharge Summary    Patient ID: Kwame Frazier   Patient : 1955  Patient's PCP: Opal Bailey MD    Admit Date: 2022   Admitting Physician: Amy Bartlett DO    Discharge Date:  8/10/2022   Discharge Physician: Amy Bartlett DO   Discharge Condition: Stable  Discharge Disposition: Home      Discharge Diagnoses: Active Hospital Problems    Diagnosis Date Noted    Cellulitis [L03.90] 2022     Priority: Medium   ? Pyoderma gangrenosum  Ii dm   Dheeraj   Ckd 3        Hospital course in brief:    had a tattoo about 1 year ago, and now it has started to show signs of infection at the tattoo site. Has been on abx, to go home on doxy. PHYSICAL EXAM:    /62   Pulse 50   Temp 98.3 °F (36.8 °C) (Oral)   Resp 16   Ht 5' 2\" (1.575 m)   Wt 203 lb (92.1 kg)   SpO2 98%   BMI 37.13 kg/m²     Gen: well developed  HEENT: NC/AT, moist mucous membranes, no oropharyngeal erythema or exudate  Neck: supple, trachea midline, no anterior cervical or SC LAD  Heart:  Normal s1/s2, RRR,  Lungs:  cta  bilaterally,  Abd: bowel sounds present, soft, nontender, nondistended, no masses  Extrem:  No clubbing, cyanosis,  no edema dsd intact left ankle  Skin: no rashes or lesions  Psych: A & O x3  Neuro: grossly intact, moves all four extremities. Prior to Admission medications    Medication Sig Start Date End Date Taking? Authorizing Provider   gabapentin (NEURONTIN) 100 MG capsule Take 1 capsule by mouth in the morning and 1 capsule at noon and 1 capsule before bedtime. Do all this for 30 days.  22 Yes Andry Mark, DO   predniSONE (DELTASONE) 10 MG tablet 4 a day x 3 day, 3 a day x 3 days , 2 a day x 3 days , 1 a day x 3 days, the dc 22  Yes Amy Bartlett,    ergocalciferol (ERGOCALCIFEROL) 1.25 MG (62491 UT) capsule Take 50,000 Units by mouth once a week **** 3/2/22  Yes Historical Provider, MD   DULoxetine (CYMBALTA) 60 MG extended release capsule Take 60 mg by mouth every morning   Yes Historical Provider, MD   traMADol (ULTRAM) 50 MG tablet Take 50 mg by mouth every 6 hours as needed for Pain. 6/27/22   Historical Provider, MD   ibuprofen (ADVIL;MOTRIN) 200 MG tablet Take 400-800 mg by mouth every 4 hours as needed 11/10/21 8/2/22  Historical Provider, MD   losartan-hydrochlorothiazide (HYZAAR) 50-12.5 MG per tablet Take 1 tablet by mouth every morning    Historical Provider, MD   atorvastatin (LIPITOR) 40 MG tablet Take 40 mg by mouth every morning    Historical Provider, MD   metFORMIN (GLUCOPHAGE) 1000 MG tablet Take 2,000 mg by mouth daily (with breakfast)    Historical Provider, MD   ferrous sulfate 325 (65 FE) MG tablet Take 325 mg by mouth daily (with breakfast)     Historical Provider, MD   aspirin 81 MG chewable tablet Take 81 mg by mouth every morning    Historical Provider, MD   allopurinol (ZYLOPRIM) 300 MG tablet Take 300 mg by mouth every morning    Historical Provider, MD       Consults:   IP CONSULT TO INTERNAL MEDICINE  IP CONSULT TO INFECTIOUS DISEASES  IP CONSULT TO GENERAL SURGERY            Discharge Instructions / Follow up:    No future appointments. Continued appropriate risk factor modification of blood pressure, diabetes and serum lipids will remain essential to reducing risk of future atherosclerotic development    Activity: activity as tolerated    Significant labs:  CBC:   No results for input(s): WBC, RBC, HGB, HCT, MCV, RDW, PLT in the last 72 hours. BMP: No results for input(s): NA, K, CL, CO2, BUN, CREATININE, CA, MG, PHOS in the last 72 hours. LFT:  No results for input(s): PROT, ALB, ALKPHOS, ALT, AST, BILITOT, AMYLASE, LIPASE in the last 72 hours. PT/INR: No results for input(s): INR, APTT in the last 72 hours. BNP: No results for input(s): BNP in the last 72 hours.   Hgb A1C:   Lab Results   Component Value Date    LABA1C 9.0 (H) 07/30/2022     Folate and B12:   Lab Results   Component Value Date    [id number] 371 08/24/2018   , No results found for: FOLATE  Thyroid Studies:   Lab Results   Component Value Date    TSH 2.710 10/19/2019       Urinalysis:    Lab Results   Component Value Date/Time    NITRU Negative 02/15/2018 10:00 PM    WBCUA 5-10 02/15/2018 10:00 PM    BACTERIA FEW 02/15/2018 10:00 PM    RBCUA >20 02/15/2018 10:00 PM    BLOODU LARGE 02/15/2018 10:00 PM    SPECGRAV 1.010 02/15/2018 10:00 PM    GLUCOSEU Negative 02/15/2018 10:00 PM       Imaging:  XR ANKLE LEFT (MIN 3 VIEWS)    Result Date: 7/29/2022  EXAMINATION: THREE XRAY VIEWS OF THE LEFT ANKLE 7/29/2022 11:00 am COMPARISON: 14 March 2019 HISTORY: ORDERING SYSTEM PROVIDED HISTORY: left ankle wound TECHNOLOGIST PROVIDED HISTORY: Reason for exam:->left ankle wound FINDINGS: There is a soft tissue wound laterally just above the ankle. There is generalized soft tissue swelling which is somewhat greater laterally than medially. No osteomyelitis, radiopaque foreign body or soft tissue emphysema. Plantar and Achilles heel spurs are as before. Soft tissue wound. No osteomyelitis. Heel spurs. Soft tissue swelling. Discharge Medications:      Medication List        START taking these medications      gabapentin 100 MG capsule  Commonly known as: NEURONTIN  Take 1 capsule by mouth in the morning and 1 capsule at noon and 1 capsule before bedtime. Do all this for 30 days.      predniSONE 10 MG tablet  Commonly known as: DELTASONE  4 a day x 3 day, 3 a day x 3 days , 2 a day x 3 days , 1 a day x 3 days, the dc            CONTINUE taking these medications      allopurinol 300 MG tablet  Commonly known as: ZYLOPRIM     aspirin 81 MG chewable tablet     atorvastatin 40 MG tablet  Commonly known as: LIPITOR     DULoxetine 60 MG extended release capsule  Commonly known as: CYMBALTA     ergocalciferol 1.25 MG (69732 UT) capsule  Commonly known as: ERGOCALCIFEROL     ferrous sulfate 325 (65 Fe) MG tablet  Commonly known as: IRON 325 losartan-hydroCHLOROthiazide 50-12.5 MG per tablet  Commonly known as: HYZAAR     metFORMIN 1000 MG tablet  Commonly known as: GLUCOPHAGE     traMADol 50 MG tablet  Commonly known as: ULTRAM            STOP taking these medications      citalopram 20 MG tablet  Commonly known as: CELEXA     glipiZIDE 2.5 MG extended release tablet  Commonly known as: GLUCOTROL XL     ibuprofen 200 MG tablet  Commonly known as: ADVIL;MOTRIN     sulfamethoxazole-trimethoprim 800-160 MG per tablet  Commonly known as: BACTRIM DS;SEPTRA DS     vitamin D 1000 UNIT Tabs tablet  Commonly known as: CHOLECALCIFEROL            ASK your doctor about these medications      doxycycline hyclate 100 MG capsule  Commonly known as: VIBRAMYCIN  Take 1 capsule by mouth in the morning and 1 capsule before bedtime. Do all this for 5 days. Ask about: Should I take this medication? Where to Get Your Medications        These medications were sent to [facility] [phone]  [address] [place] [address] [place], [address] [place] [address]      Phone: [phone]   doxycycline hyclate 100 MG capsule  gabapentin 100 MG capsule  predniSONE 10 MG tablet         Time Spent on discharge is more than 45 minutes in the examination, evaluation, counseling and review of medications and discharge plan.    +++++++++++++++++++++++++++++++++++++++++++++++++  Andry Zavala, DO  [address] [facility] [employer], [address]  +++++++++++++++++++++++++++++++++++++++++++++++++  NOTE: This report was transcribed using voice recognition software. Every effort was made to ensure accuracy; however, inadvertent computerized transcription errors may be present.

## 2022-08-18 ENCOUNTER — APPOINTMENT (OUTPATIENT)
Dept: GENERAL RADIOLOGY | Age: 67
DRG: 638 | End: 2022-08-18
Payer: MEDICARE

## 2022-08-18 ENCOUNTER — HOSPITAL ENCOUNTER (INPATIENT)
Age: 67
LOS: 4 days | Discharge: HOME | DRG: 638 | End: 2022-08-22
Attending: EMERGENCY MEDICINE | Admitting: INTERNAL MEDICINE
Payer: MEDICARE

## 2022-08-18 DIAGNOSIS — L08.9 WOUND INFECTION: Primary | ICD-10-CM

## 2022-08-18 DIAGNOSIS — T14.8XXA WOUND INFECTION: Primary | ICD-10-CM

## 2022-08-18 DIAGNOSIS — N17.9 AKI (ACUTE KIDNEY INJURY) (HCC): ICD-10-CM

## 2022-08-18 LAB
ANION GAP SERPL CALCULATED.3IONS-SCNC: 13 MMOL/L (ref 7–16)
BASOPHILS ABSOLUTE: 0.02 E9/L (ref 0–0.2)
BASOPHILS RELATIVE PERCENT: 0.2 % (ref 0–2)
BUN BLDV-MCNC: 25 MG/DL (ref 6–23)
C-REACTIVE PROTEIN: 10.2 MG/DL (ref 0–0.4)
CALCIUM SERPL-MCNC: 10.5 MG/DL (ref 8.6–10.2)
CHLORIDE BLD-SCNC: 94 MMOL/L (ref 98–107)
CO2: 26 MMOL/L (ref 22–29)
CREAT SERPL-MCNC: 1.7 MG/DL (ref 0.5–1)
EOSINOPHILS ABSOLUTE: 0.1 E9/L (ref 0.05–0.5)
EOSINOPHILS RELATIVE PERCENT: 1.1 % (ref 0–6)
GFR AFRICAN AMERICAN: 36
GFR NON-AFRICAN AMERICAN: 30 ML/MIN/1.73
GLUCOSE BLD-MCNC: 182 MG/DL (ref 74–99)
HCT VFR BLD CALC: 30 % (ref 34–48)
HEMOGLOBIN: 9 G/DL (ref 11.5–15.5)
IMMATURE GRANULOCYTES #: 0.03 E9/L
IMMATURE GRANULOCYTES %: 0.3 % (ref 0–5)
LACTIC ACID: 1.9 MMOL/L (ref 0.5–2.2)
LYMPHOCYTES ABSOLUTE: 1.21 E9/L (ref 1.5–4)
LYMPHOCYTES RELATIVE PERCENT: 12.9 % (ref 20–42)
MCH RBC QN AUTO: 28.1 PG (ref 26–35)
MCHC RBC AUTO-ENTMCNC: 30 % (ref 32–34.5)
MCV RBC AUTO: 93.8 FL (ref 80–99.9)
MONOCYTES ABSOLUTE: 0.46 E9/L (ref 0.1–0.95)
MONOCYTES RELATIVE PERCENT: 4.9 % (ref 2–12)
NEUTROPHILS ABSOLUTE: 7.54 E9/L (ref 1.8–7.3)
NEUTROPHILS RELATIVE PERCENT: 80.6 % (ref 43–80)
PDW BLD-RTO: 15.1 FL (ref 11.5–15)
PLATELET # BLD: 364 E9/L (ref 130–450)
PMV BLD AUTO: 10.2 FL (ref 7–12)
POTASSIUM SERPL-SCNC: 4 MMOL/L (ref 3.5–5)
RBC # BLD: 3.2 E12/L (ref 3.5–5.5)
SEDIMENTATION RATE, ERYTHROCYTE: 111 MM/HR (ref 0–20)
SODIUM BLD-SCNC: 133 MMOL/L (ref 132–146)
WBC # BLD: 9.4 E9/L (ref 4.5–11.5)

## 2022-08-18 PROCEDURE — 6370000000 HC RX 637 (ALT 250 FOR IP): Performed by: FAMILY MEDICINE

## 2022-08-18 PROCEDURE — 99285 EMERGENCY DEPT VISIT HI MDM: CPT

## 2022-08-18 PROCEDURE — 87070 CULTURE OTHR SPECIMN AEROBIC: CPT

## 2022-08-18 PROCEDURE — 86140 C-REACTIVE PROTEIN: CPT

## 2022-08-18 PROCEDURE — 85651 RBC SED RATE NONAUTOMATED: CPT

## 2022-08-18 PROCEDURE — 2580000003 HC RX 258: Performed by: FAMILY MEDICINE

## 2022-08-18 PROCEDURE — 36415 COLL VENOUS BLD VENIPUNCTURE: CPT

## 2022-08-18 PROCEDURE — 83605 ASSAY OF LACTIC ACID: CPT

## 2022-08-18 PROCEDURE — 85025 COMPLETE CBC W/AUTO DIFF WBC: CPT

## 2022-08-18 PROCEDURE — 2580000003 HC RX 258: Performed by: PHYSICIAN ASSISTANT

## 2022-08-18 PROCEDURE — 87040 BLOOD CULTURE FOR BACTERIA: CPT

## 2022-08-18 PROCEDURE — 1200000000 HC SEMI PRIVATE

## 2022-08-18 PROCEDURE — 6370000000 HC RX 637 (ALT 250 FOR IP): Performed by: PHYSICIAN ASSISTANT

## 2022-08-18 PROCEDURE — 73610 X-RAY EXAM OF ANKLE: CPT

## 2022-08-18 PROCEDURE — 80048 BASIC METABOLIC PNL TOTAL CA: CPT

## 2022-08-18 PROCEDURE — 87075 CULTR BACTERIA EXCEPT BLOOD: CPT

## 2022-08-18 PROCEDURE — 96374 THER/PROPH/DIAG INJ IV PUSH: CPT

## 2022-08-18 PROCEDURE — 6360000002 HC RX W HCPCS: Performed by: PHYSICIAN ASSISTANT

## 2022-08-18 RX ORDER — ACETAMINOPHEN 500 MG
1000 TABLET ORAL ONCE
Status: COMPLETED | OUTPATIENT
Start: 2022-08-18 | End: 2022-08-18

## 2022-08-18 RX ORDER — ASPIRIN 81 MG/1
81 TABLET, CHEWABLE ORAL EVERY MORNING
Status: DISCONTINUED | OUTPATIENT
Start: 2022-08-19 | End: 2022-08-22 | Stop reason: HOSPADM

## 2022-08-18 RX ORDER — ACETAMINOPHEN 650 MG/1
650 SUPPOSITORY RECTAL EVERY 6 HOURS PRN
Status: DISCONTINUED | OUTPATIENT
Start: 2022-08-18 | End: 2022-08-22 | Stop reason: HOSPADM

## 2022-08-18 RX ORDER — SODIUM CHLORIDE 9 MG/ML
INJECTION, SOLUTION INTRAVENOUS PRN
Status: DISCONTINUED | OUTPATIENT
Start: 2022-08-18 | End: 2022-08-22 | Stop reason: HOSPADM

## 2022-08-18 RX ORDER — FERROUS SULFATE 325(65) MG
325 TABLET ORAL
Status: DISCONTINUED | OUTPATIENT
Start: 2022-08-19 | End: 2022-08-22 | Stop reason: HOSPADM

## 2022-08-18 RX ORDER — DULOXETIN HYDROCHLORIDE 60 MG/1
60 CAPSULE, DELAYED RELEASE ORAL EVERY MORNING
Status: DISCONTINUED | OUTPATIENT
Start: 2022-08-19 | End: 2022-08-22 | Stop reason: HOSPADM

## 2022-08-18 RX ORDER — ALLOPURINOL 300 MG/1
300 TABLET ORAL EVERY MORNING
Status: DISCONTINUED | OUTPATIENT
Start: 2022-08-19 | End: 2022-08-22 | Stop reason: HOSPADM

## 2022-08-18 RX ORDER — 0.9 % SODIUM CHLORIDE 0.9 %
1000 INTRAVENOUS SOLUTION INTRAVENOUS ONCE
Status: DISCONTINUED | OUTPATIENT
Start: 2022-08-18 | End: 2022-08-22 | Stop reason: HOSPADM

## 2022-08-18 RX ORDER — SODIUM CHLORIDE 0.9 % (FLUSH) 0.9 %
10 SYRINGE (ML) INJECTION PRN
Status: DISCONTINUED | OUTPATIENT
Start: 2022-08-18 | End: 2022-08-18 | Stop reason: SDUPTHER

## 2022-08-18 RX ORDER — ERGOCALCIFEROL (VITAMIN D2) 1250 MCG
50000 CAPSULE ORAL WEEKLY
Status: DISCONTINUED | OUTPATIENT
Start: 2022-08-19 | End: 2022-08-22 | Stop reason: HOSPADM

## 2022-08-18 RX ORDER — SODIUM CHLORIDE 9 MG/ML
INJECTION, SOLUTION INTRAVENOUS CONTINUOUS
Status: DISCONTINUED | OUTPATIENT
Start: 2022-08-18 | End: 2022-08-20

## 2022-08-18 RX ORDER — ACETAMINOPHEN 325 MG/1
650 TABLET ORAL EVERY 6 HOURS PRN
Status: DISCONTINUED | OUTPATIENT
Start: 2022-08-18 | End: 2022-08-22 | Stop reason: HOSPADM

## 2022-08-18 RX ORDER — ENOXAPARIN SODIUM 100 MG/ML
40 INJECTION SUBCUTANEOUS DAILY
Status: DISCONTINUED | OUTPATIENT
Start: 2022-08-18 | End: 2022-08-22 | Stop reason: HOSPADM

## 2022-08-18 RX ORDER — ATORVASTATIN CALCIUM 40 MG/1
40 TABLET, FILM COATED ORAL EVERY MORNING
Status: DISCONTINUED | OUTPATIENT
Start: 2022-08-19 | End: 2022-08-22 | Stop reason: HOSPADM

## 2022-08-18 RX ORDER — GABAPENTIN 100 MG/1
100 CAPSULE ORAL 3 TIMES DAILY
Status: DISCONTINUED | OUTPATIENT
Start: 2022-08-18 | End: 2022-08-22 | Stop reason: HOSPADM

## 2022-08-18 RX ORDER — ONDANSETRON 4 MG/1
4 TABLET, ORALLY DISINTEGRATING ORAL EVERY 8 HOURS PRN
Status: DISCONTINUED | OUTPATIENT
Start: 2022-08-18 | End: 2022-08-22 | Stop reason: HOSPADM

## 2022-08-18 RX ORDER — ONDANSETRON 2 MG/ML
4 INJECTION INTRAMUSCULAR; INTRAVENOUS EVERY 6 HOURS PRN
Status: DISCONTINUED | OUTPATIENT
Start: 2022-08-18 | End: 2022-08-22 | Stop reason: HOSPADM

## 2022-08-18 RX ORDER — SODIUM CHLORIDE 0.9 % (FLUSH) 0.9 %
10 SYRINGE (ML) INJECTION EVERY 12 HOURS SCHEDULED
Status: DISCONTINUED | OUTPATIENT
Start: 2022-08-18 | End: 2022-08-22 | Stop reason: HOSPADM

## 2022-08-18 RX ORDER — TRAMADOL HYDROCHLORIDE 50 MG/1
50 TABLET ORAL EVERY 6 HOURS PRN
Status: DISCONTINUED | OUTPATIENT
Start: 2022-08-18 | End: 2022-08-22 | Stop reason: HOSPADM

## 2022-08-18 RX ORDER — SODIUM CHLORIDE 0.9 % (FLUSH) 0.9 %
10 SYRINGE (ML) INJECTION PRN
Status: DISCONTINUED | OUTPATIENT
Start: 2022-08-18 | End: 2022-08-22 | Stop reason: HOSPADM

## 2022-08-18 RX ADMIN — TRAMADOL HYDROCHLORIDE 50 MG: 50 TABLET, COATED ORAL at 21:19

## 2022-08-18 RX ADMIN — GABAPENTIN 100 MG: 100 CAPSULE ORAL at 21:19

## 2022-08-18 RX ADMIN — ACETAMINOPHEN 1000 MG: 500 TABLET ORAL at 15:43

## 2022-08-18 RX ADMIN — SODIUM CHLORIDE: 9 INJECTION, SOLUTION INTRAVENOUS at 19:02

## 2022-08-18 RX ADMIN — CEFAZOLIN 1000 MG: 1 INJECTION, POWDER, FOR SOLUTION INTRAMUSCULAR; INTRAVENOUS at 15:43

## 2022-08-18 RX ADMIN — ACETAMINOPHEN 650 MG: 325 TABLET ORAL at 22:45

## 2022-08-18 ASSESSMENT — PAIN - FUNCTIONAL ASSESSMENT
PAIN_FUNCTIONAL_ASSESSMENT: PREVENTS OR INTERFERES SOME ACTIVE ACTIVITIES AND ADLS
PAIN_FUNCTIONAL_ASSESSMENT: 0-10
PAIN_FUNCTIONAL_ASSESSMENT: PREVENTS OR INTERFERES SOME ACTIVE ACTIVITIES AND ADLS

## 2022-08-18 ASSESSMENT — PAIN DESCRIPTION - LOCATION
LOCATION: ANKLE
LOCATION: ANKLE

## 2022-08-18 ASSESSMENT — PAIN SCALES - GENERAL
PAINLEVEL_OUTOF10: 2
PAINLEVEL_OUTOF10: 5
PAINLEVEL_OUTOF10: 5

## 2022-08-18 ASSESSMENT — PAIN DESCRIPTION - DESCRIPTORS
DESCRIPTORS: THROBBING
DESCRIPTORS: BURNING

## 2022-08-18 ASSESSMENT — PAIN DESCRIPTION - ORIENTATION
ORIENTATION: LEFT
ORIENTATION: LEFT

## 2022-08-18 NOTE — PROGRESS NOTES
Admission database completed to best of this RN's ability. Care plan and education initiated. Pt independent from home with . Discharged 8/8/2022 from [facility]; treated for cellulitis of R ankle and sent home on oral ATB. Denies any use of DME or HHC services prior to admission.

## 2022-08-18 NOTE — ED PROVIDER NOTES
ED Attending Shared Visit: CC: [facility]  Department of Emergency Medicine   ED  Encounter Note  Admit Date/RoomTime: 2022  2:23 PM  ED Room: \[facility]\""/[place] [address]    NAME: Jacob Garcia  : 1955  MRN: [id number]     Chief Complaint:  Wound Check (Infected tattoo )    History of Present Illness       Jacob Garcia is a 79 y.o. old female with a past medical history consistent with infection to the left ankle following a tattoo 10 months earlier. Patient was admitted to the hospital end of July for cellulitis of the left ankle. She was treated with IV Doxy and Ancef and discharged home on oral medications. She states that she never really ever got better. She was seen by her primary care doctor today for increased pain and redness to the ankle and was advised to come back to the hospital for further evaluation. She did arrive with a low-grade temperature of 100.6 with mild tachycardia and erythema and tenderness to the left ankle. She has no other areas of concern and no other complaints at this time. ROS   Pertinent positives and negatives are stated within HPI, all other systems reviewed and are negative. Past Medical History:  has a past medical history of Arthritis, Depression, Diabetes mellitus ([address] Hyperlipidemia, Hypertension, and Ulcerative colitis ([address] Surgical History:  has a past surgical history that includes Cholecystectomy; Tonsillectomy; Colonoscopy; Carpal tunnel release; Knee arthroscopy; Upper gastrointestinal endoscopy; colectomy; and pr excis rectal lesion (N/A, 2019). Social History:  reports that she quit smoking about 30 years ago. Her smoking use included cigarettes. She has a 10.00 pack-year smoking history. She has never used smokeless tobacco. She reports that she does not drink alcohol and does not use drugs. Family History: family history is not on file. Allergies: Patient has no known allergies.     Physical Exam Oxygen Saturation Interpretation: Normal.        ED Triage Vitals [08/18/22 1352]   BP Temp Temp Source Heart Rate Resp SpO2 Height Weight   119/71 (!) 100.6 °F (38.1 °C) Oral (!) 125 17 98 % 5' 2\" (1.575 m) 203 lb (92.1 kg)         Constitutional:  Alert, development consistent with age. HEENT:  NC/NT. Airway patent. Eyes:  PERRL, EOMI, no discharge. Ears:  TMs without perforation, injection, or bulging. External canals clear without exudate. Mouth:  Mucous membranes moist without lesions, tongue and gums normal.  Throat:  Pharynx without injection, exudate, or tonsillar hypertrophy. Airway patient. Neck:  Supple. No lymphadenopathy. Respiratory:  Clear to auscultation and breath sounds equal.  CV:  Regular rate and rhythm. GI:  Abdomen Soft, nontender, +BS. Extremity:                           Lymphatics: no lymphadenopathy noted  Neurological:  Orientation age-appropriate unless noted elseware. Motor functions intact.     Lab / Imaging Results   (All laboratory and radiology results have been personally reviewed by myself)  Labs:  Results for orders placed or performed during the hospital encounter of 08/18/22   CBC with Auto Differential   Result Value Ref Range    WBC 9.4 4.5 - 11.5 E9/L    RBC 3.20 (L) 3.50 - 5.50 E12/L    Hemoglobin 9.0 (L) 11.5 - 15.5 g/dL    Hematocrit 30.0 (L) 34.0 - 48.0 %    MCV 93.8 80.0 - 99.9 fL    MCH 28.1 26.0 - 35.0 pg    MCHC 30.0 (L) 32.0 - 34.5 %    RDW 15.1 (H) 11.5 - 15.0 fL    Platelets 537 526 - 994 E9/L    MPV 10.2 7.0 - 12.0 fL    Neutrophils % 80.6 (H) 43.0 - 80.0 %    Immature Granulocytes % 0.3 0.0 - 5.0 %    Lymphocytes % 12.9 (L) 20.0 - 42.0 %    Monocytes % 4.9 2.0 - 12.0 %    Eosinophils % 1.1 0.0 - 6.0 %    Basophils % 0.2 0.0 - 2.0 %    Neutrophils Absolute 7.54 (H) 1.80 - 7.30 E9/L    Immature Granulocytes # 0.03 E9/L    Lymphocytes Absolute 1.21 (L) 1.50 - 4.00 E9/L    Monocytes Absolute 0.46 0.10 - 0.95 E9/L    Eosinophils Absolute 0.10 0.05 - 0.50 E9/L    Basophils Absolute 0.02 0.00 - 0.20 O0/P   Basic Metabolic Panel   Result Value Ref Range    Sodium 133 132 - 146 mmol/L    Potassium 4.0 3.5 - 5.0 mmol/L    Chloride 94 (L) 98 - 107 mmol/L    CO2 26 22 - 29 mmol/L    Anion Gap 13 7 - 16 mmol/L    Glucose 182 (H) 74 - 99 mg/dL    BUN 25 (H) 6 - 23 mg/dL    Creatinine 1.7 (H) 0.5 - 1.0 mg/dL    GFR Non-African American 30 >=60 mL/min/1.73    GFR African American 36     Calcium 10.5 (H) 8.6 - 10.2 mg/dL       Imaging: All Radiology results interpreted by Radiologist unless otherwise noted. XR ANKLE LEFT (MIN 3 VIEWS)   Final Result   No acute abnormality of the ankle. Diffuse soft tissue swelling. ED Course / Medical Decision Making     Medications   sodium chloride flush 0.9 % injection 10 mL (has no administration in time range)   doxycycline (VIBRAMYCIN) 100 mg in dextrose 5 % 100 mL IVPB (has no administration in time range)   0.9 % sodium chloride bolus (has no administration in time range)   acetaminophen (TYLENOL) tablet 1,000 mg (1,000 mg Oral Given 8/18/22 1543)   ceFAZolin (ANCEF) 1,000 mg in sterile water 10 mL IV syringe (1,000 mg IntraVENous Given 8/18/22 1543)        Consults:   IP CONSULT TO HOSPITALIST  IP CONSULT TO INFECTIOUS DISEASES    MDM:   Patient presented with worsening of wound to the left ankle which she was previously hospitalized for after having a tattoo in September 2021. She also demonstrates acute on chronic renal impairment in the ED. She had a fever 100.6 and was tachycardic. She received IV Ancef and doxycycline with cultures while awaiting bed assignment. Plan of Care/Counseling:  Derrick Liang reviewed today's visit with the patient in addition to providing specific details for the plan of care and counseling regarding the diagnosis and prognosis. Questions are answered at this time and are agreeable with the plan. Assessment      1. Wound infection    2.  KARAN (acute kidney injury) ([facility] [address] Plan   Admit to General Medical Floor. Patient condition is stable    New Medications     New Prescriptions    No medications on file     Electronically signed by YOKASTA Mccarty   DD: 8/18/22  **This report was transcribed using voice recognition software. Every effort was made to ensure accuracy; however, inadvertent computerized transcription errors may be present.   END OF ED PROVIDER NOTE       Derrick Del Rosario  08/18/22 [id number]

## 2022-08-19 LAB
ANION GAP SERPL CALCULATED.3IONS-SCNC: 9 MMOL/L (ref 7–16)
ANTISTREPTOLYSIN-O: 56 IU/ML (ref 0–200)
BASOPHILS ABSOLUTE: 0.01 E9/L (ref 0–0.2)
BASOPHILS RELATIVE PERCENT: 0.1 % (ref 0–2)
BUN BLDV-MCNC: 24 MG/DL (ref 6–23)
CALCIUM SERPL-MCNC: 9.9 MG/DL (ref 8.6–10.2)
CHLORIDE BLD-SCNC: 100 MMOL/L (ref 98–107)
CO2: 25 MMOL/L (ref 22–29)
CREAT SERPL-MCNC: 1.5 MG/DL (ref 0.5–1)
EOSINOPHILS ABSOLUTE: 0.15 E9/L (ref 0.05–0.5)
EOSINOPHILS RELATIVE PERCENT: 2.1 % (ref 0–6)
GFR AFRICAN AMERICAN: 42
GFR NON-AFRICAN AMERICAN: 35 ML/MIN/1.73
GLUCOSE BLD-MCNC: 189 MG/DL (ref 74–99)
HCT VFR BLD CALC: 25.7 % (ref 34–48)
HEMOGLOBIN: 7.8 G/DL (ref 11.5–15.5)
IMMATURE GRANULOCYTES #: 0.02 E9/L
IMMATURE GRANULOCYTES %: 0.3 % (ref 0–5)
LYMPHOCYTES ABSOLUTE: 1.33 E9/L (ref 1.5–4)
LYMPHOCYTES RELATIVE PERCENT: 18.9 % (ref 20–42)
MCH RBC QN AUTO: 27.9 PG (ref 26–35)
MCHC RBC AUTO-ENTMCNC: 30.4 % (ref 32–34.5)
MCV RBC AUTO: 91.8 FL (ref 80–99.9)
MONOCYTES ABSOLUTE: 0.49 E9/L (ref 0.1–0.95)
MONOCYTES RELATIVE PERCENT: 7 % (ref 2–12)
NEUTROPHILS ABSOLUTE: 5.02 E9/L (ref 1.8–7.3)
NEUTROPHILS RELATIVE PERCENT: 71.6 % (ref 43–80)
PDW BLD-RTO: 14.7 FL (ref 11.5–15)
PLATELET # BLD: 295 E9/L (ref 130–450)
PMV BLD AUTO: 9.9 FL (ref 7–12)
POTASSIUM REFLEX MAGNESIUM: 4.3 MMOL/L (ref 3.5–5)
RBC # BLD: 2.8 E12/L (ref 3.5–5.5)
SODIUM BLD-SCNC: 134 MMOL/L (ref 132–146)
WBC # BLD: 7 E9/L (ref 4.5–11.5)

## 2022-08-19 PROCEDURE — 2580000003 HC RX 258: Performed by: FAMILY MEDICINE

## 2022-08-19 PROCEDURE — 6360000002 HC RX W HCPCS: Performed by: FAMILY MEDICINE

## 2022-08-19 PROCEDURE — 85025 COMPLETE CBC W/AUTO DIFF WBC: CPT

## 2022-08-19 PROCEDURE — 6370000000 HC RX 637 (ALT 250 FOR IP): Performed by: REGISTERED NURSE

## 2022-08-19 PROCEDURE — 80048 BASIC METABOLIC PNL TOTAL CA: CPT

## 2022-08-19 PROCEDURE — 36415 COLL VENOUS BLD VENIPUNCTURE: CPT

## 2022-08-19 PROCEDURE — 86060 ANTISTREPTOLYSIN O TITER: CPT

## 2022-08-19 PROCEDURE — 6370000000 HC RX 637 (ALT 250 FOR IP): Performed by: FAMILY MEDICINE

## 2022-08-19 PROCEDURE — 1200000000 HC SEMI PRIVATE

## 2022-08-19 RX ORDER — DOXYCYCLINE HYCLATE 100 MG/1
100 CAPSULE ORAL EVERY 12 HOURS SCHEDULED
Status: DISCONTINUED | OUTPATIENT
Start: 2022-08-19 | End: 2022-08-22 | Stop reason: HOSPADM

## 2022-08-19 RX ADMIN — TRAMADOL HYDROCHLORIDE 50 MG: 50 TABLET, COATED ORAL at 20:58

## 2022-08-19 RX ADMIN — GABAPENTIN 100 MG: 100 CAPSULE ORAL at 13:41

## 2022-08-19 RX ADMIN — DOXYCYCLINE HYCLATE 100 MG: 100 CAPSULE ORAL at 11:54

## 2022-08-19 RX ADMIN — DULOXETINE HYDROCHLORIDE 60 MG: 60 CAPSULE, DELAYED RELEASE ORAL at 09:03

## 2022-08-19 RX ADMIN — SODIUM CHLORIDE: 9 INJECTION, SOLUTION INTRAVENOUS at 06:11

## 2022-08-19 RX ADMIN — ATORVASTATIN CALCIUM 40 MG: 40 TABLET, FILM COATED ORAL at 09:03

## 2022-08-19 RX ADMIN — TRAMADOL HYDROCHLORIDE 50 MG: 50 TABLET, COATED ORAL at 13:41

## 2022-08-19 RX ADMIN — ASPIRIN 81 MG CHEWABLE TABLET 81 MG: 81 TABLET CHEWABLE at 09:03

## 2022-08-19 RX ADMIN — ALLOPURINOL 300 MG: 300 TABLET ORAL at 09:03

## 2022-08-19 RX ADMIN — FERROUS SULFATE TAB 325 MG (65 MG ELEMENTAL FE) 325 MG: 325 (65 FE) TAB at 09:03

## 2022-08-19 RX ADMIN — CEFAZOLIN 1000 MG: 1 INJECTION, POWDER, FOR SOLUTION INTRAMUSCULAR; INTRAVENOUS at 00:32

## 2022-08-19 RX ADMIN — SODIUM CHLORIDE: 9 INJECTION, SOLUTION INTRAVENOUS at 18:35

## 2022-08-19 RX ADMIN — ACETAMINOPHEN 650 MG: 325 TABLET ORAL at 06:11

## 2022-08-19 RX ADMIN — DOXYCYCLINE HYCLATE 100 MG: 100 CAPSULE ORAL at 20:58

## 2022-08-19 RX ADMIN — GABAPENTIN 100 MG: 100 CAPSULE ORAL at 09:03

## 2022-08-19 RX ADMIN — CEFAZOLIN 1000 MG: 1 INJECTION, POWDER, FOR SOLUTION INTRAMUSCULAR; INTRAVENOUS at 09:02

## 2022-08-19 RX ADMIN — ERGOCALCIFEROL 50000 UNITS: 1.25 CAPSULE ORAL at 09:03

## 2022-08-19 RX ADMIN — GABAPENTIN 100 MG: 100 CAPSULE ORAL at 20:58

## 2022-08-19 ASSESSMENT — PAIN DESCRIPTION - DESCRIPTORS
DESCRIPTORS: ACHING;BURNING;SORE
DESCRIPTORS: THROBBING
DESCRIPTORS: BURNING

## 2022-08-19 ASSESSMENT — PAIN SCALES - GENERAL
PAINLEVEL_OUTOF10: 2
PAINLEVEL_OUTOF10: 3
PAINLEVEL_OUTOF10: 0
PAINLEVEL_OUTOF10: 4
PAINLEVEL_OUTOF10: 5

## 2022-08-19 ASSESSMENT — PAIN DESCRIPTION - LOCATION
LOCATION: LEG
LOCATION: ANKLE
LOCATION: FOOT

## 2022-08-19 ASSESSMENT — PAIN DESCRIPTION - ORIENTATION
ORIENTATION: LEFT

## 2022-08-19 ASSESSMENT — PAIN - FUNCTIONAL ASSESSMENT
PAIN_FUNCTIONAL_ASSESSMENT: ACTIVITIES ARE NOT PREVENTED
PAIN_FUNCTIONAL_ASSESSMENT: PREVENTS OR INTERFERES SOME ACTIVE ACTIVITIES AND ADLS
PAIN_FUNCTIONAL_ASSESSMENT: PREVENTS OR INTERFERES SOME ACTIVE ACTIVITIES AND ADLS

## 2022-08-19 NOTE — H&P
[facility]  History and Physical      CHIEF COMPLAINT:    Chief Complaint   Patient presents with    Wound Check     Infected tattoo         Patient of Jose Roberto Ward MD presents with:  Cellulitis    History of Present Illness:   Patient is a 72-year-old female with a past medical history of arthritis, depression, diabetes, hyperlipidemia, hypertension, and ulcerative colitis. Patient does have a history of infection to the left ankle following a tattoo placed approximately 10 months ago. Patient was admitted to the hospital the end of July for cellulitis of the left ankle. Patient was treated with IV Doxy and Ancef and discharged home on oral medications. Patient did follow with infectious disease during her hospital stay. Patient states she is never really got any better and she was seen by her primary care doctor for the increased pain and redness and swelling and was advised to go to the ER for further evaluation. When patient arrived to the ED she had a low-grade temperature of 100.6 with mild tachycardia and erythema and tenderness to the left ankle. ER work-up revealed elevated BUN/creatinine 25/1.7, elevated glucose level 182, white count 9.4, x-ray soft tissue swelling. Patient was started on IV antibiotics and admitted to [place] [address] unit for further testing and treatment. Patient indicates she completed a course of doxycycline and steroid taper at home on previous admission that she was sent home with. She noted worsening of her status of the leg soon thereafter with burning sensation pain and discomfort. Therefore she proceeded to her PCP who directed her to surgery team that did the initial biopsy. Secondary to no improvement in her leg, and actually worsening, Dr. Erin Poon redirected her to the ER for possible ongoing IV antibiotic therapy      REVIEW OF SYSTEMS:  Pertinent negatives are above in HPI. 10 point ROS otherwise negative.       Past Medical History:   Diagnosis Date    Arthritis     Depression     Diabetes mellitus ([facility] [address]     Hyperlipidemia     Hypertension     Ulcerative colitis ([facility] [address]          Past Surgical History:   Procedure Laterality Date    CARPAL TUNNEL RELEASE      CHOLECYSTECTOMY      COLECTOMY      COLONOSCOPY      KNEE ARTHROSCOPY      MA EXCIS RECTAL LESION N/A 2/13/2019    EXAMINE UNDER ANESTHESIA WITH RECTAL INCISION AND DRAINAGE performed by Joey Coon MD at [place] [address] ENDOSCOPY         Medications Prior to Admission:    Medications Prior to Admission: gabapentin (NEURONTIN) 100 MG capsule, Take 1 capsule by mouth in the morning and 1 capsule at noon and 1 capsule before bedtime. Do all this for 30 days. ergocalciferol (ERGOCALCIFEROL) 1.25 MG (63066 UT) capsule, Take 50,000 Units by mouth once a week **FRIDAYS**  DULoxetine (CYMBALTA) 60 MG extended release capsule, Take 60 mg by mouth every morning  losartan-hydrochlorothiazide (HYZAAR) 50-12.5 MG per tablet, Take 1 tablet by mouth every morning  atorvastatin (LIPITOR) 40 MG tablet, Take 40 mg by mouth every morning  metFORMIN (GLUCOPHAGE) 1000 MG tablet, Take 2,000 mg by mouth daily (with breakfast)  ferrous sulfate 325 (65 FE) MG tablet, Take 325 mg by mouth daily (with breakfast)   aspirin 81 MG chewable tablet, Take 81 mg by mouth every morning  allopurinol (ZYLOPRIM) 300 MG tablet, Take 300 mg by mouth every morning    Note that the patient's home medications were reviewed and the above list is accurate to the best of my knowledge at the time of the exam.    Allergies:    Patient has no known allergies. Social History:    reports that she quit smoking about 30 years ago. Her smoking use included cigarettes. She has a 10.00 pack-year smoking history. She has never used smokeless tobacco. She reports that she does not drink alcohol and does not use drugs.     Family History:   Unable to obtain at this time      PHYSICAL EXAM:    Vitals:  /65 Pulse 97   Temp 98.9 °F (37.2 °C) (Oral)   Resp 18   Ht 5' 2\" (1.575 m)   Wt 195 lb (88.5 kg)   SpO2 99%   BMI 35.67 kg/m²       General appearance: NAD, conversant  Eyes: Sclerae anicteric, PERRLA  HEENT: AT/NC, MMM  Neck: FROM, supple, no thyromegaly  Lymph: No cervical / supraclavicular lymphadenopathy  Lungs: Clear to auscultation, WOB normal  CV: RRR, no MRGs, no lower extremity edema  Abdomen: Soft, non-tender; no masses or HSM, +BS  Extremities: Left ankle area-what appears to be denuded skin, is to be in the process of healing, no evidence of purulence        Skin:   Psych: Calm and cooperative. Normal judgement and insight. Normal mood and affect. Neuro: Alert and interactive, face symmetric, speech fluent. LABS:  All labs reviewed.   Of note:  CBC with Differential:    Lab Results   Component Value Date/Time    WBC 7.0 08/19/2022 03:25 AM    RBC 2.80 08/19/2022 03:25 AM    HGB 7.8 08/19/2022 03:25 AM    HCT 25.7 08/19/2022 03:25 AM     08/19/2022 03:25 AM    MCV 91.8 08/19/2022 03:25 AM    MCH 27.9 08/19/2022 03:25 AM    MCHC 30.4 08/19/2022 03:25 AM    RDW 14.7 08/19/2022 03:25 AM    LYMPHOPCT 18.9 08/19/2022 03:25 AM    MONOPCT 7.0 08/19/2022 03:25 AM    BASOPCT 0.1 08/19/2022 03:25 AM    MONOSABS 0.49 08/19/2022 03:25 AM    LYMPHSABS 1.33 08/19/2022 03:25 AM    EOSABS 0.15 08/19/2022 03:25 AM    BASOSABS 0.01 08/19/2022 03:25 AM     CMP:    Lab Results   Component Value Date/Time     08/19/2022 03:25 AM    K 4.3 08/19/2022 03:25 AM     08/19/2022 03:25 AM    CO2 25 08/19/2022 03:25 AM    BUN 24 08/19/2022 03:25 AM    CREATININE 1.5 08/19/2022 03:25 AM    GFRAA 42 08/19/2022 03:25 AM    LABGLOM 35 08/19/2022 03:25 AM    GLUCOSE 189 08/19/2022 03:25 AM    PROT 7.7 07/29/2022 12:05 PM    LABALBU 3.5 07/29/2022 12:05 PM    CALCIUM 9.9 08/19/2022 03:25 AM    BILITOT 0.2 07/29/2022 12:05 PM    ALKPHOS 93 07/29/2022 12:05 PM    AST 14 07/29/2022 12:05 PM    ALT 16 07/29/2022 12:05 PM       Imaging:  X-ray left ankle: No acute abnormality of the ankle. Diffuse soft tissue swelling. Telemetry:  I've personally reviewed the patient's telemetry:      ASSESSMENT/PLAN:  Principal Problem:    Cellulitis  Resolved Problems:    * No resolved hospital problems. *    44-year-old female with a history of cellulitis to the left ankle treated in July presents to the ED with concern for recurrence and is admitted to [place] [address] unit with    Cellulitis/open wound at site of previous tattoo status post recent treatment with antibiotics and short burst of steroids  Monitor labs - WBC 11.0  Sed rate 111  CRP 10.2  IV Ancef  Consult ID - await input  Pyoderma gangrenosum was working diagnosis on previous admission given patient's existing inflammatory bowel disease, she was placed on steroid therapy with taper at home-however secondary to no significant improvement she now returns  Case discussed with Dr. Kenia Stevens consult for further recommendations  Blood cultures pending    KARAN on CKD  -Monitor labs  -25/1.7 > 24/1.5, baseline 18/1.1  IV hydration  -Hold nephrotoxins    Diabetes Mellitus  -Monitor labs  -ISS glucose control  -Hypoglycemia protocol initiated     Medication for other comorbidities continue as appropriate dose adjustment as necessary.     DVT prophylaxis  PT OT  Discharge planning        Code status: Full  Requires inpatient level of care      Jonny Knowles MD  8:02 AM  8/19/2022

## 2022-08-19 NOTE — PROGRESS NOTES
Wound care consult done through [facility].   Electronically signed by Edwige Martin RN on 8/18/2022 at 8:41 PM

## 2022-08-19 NOTE — PLAN OF CARE
Problem: Pain  Goal: Verbalizes/displays adequate comfort level or baseline comfort level  Outcome: Progressing     Problem: Discharge Planning  Goal: Discharge to home or other facility with appropriate resources  Outcome: Progressing  Flowsheets (Taken 8/18/2022 1638 by Sarah Flor RN)  Discharge to home or other facility with appropriate resources: Identify barriers to discharge with patient and caregiver     Problem: Safety - Adult  Goal: Free from fall injury  Outcome: Progressing

## 2022-08-19 NOTE — PROGRESS NOTES
ID consult completed by charge RN.   Electronically signed by Kristie Mobley RN on 8/18/2022 at 8:23 PM

## 2022-08-19 NOTE — PLAN OF CARE
Problem: Pain  Goal: Verbalizes/displays adequate comfort level or baseline comfort level  8/19/2022 1307 by Shruti Dick RN  Outcome: Progressing  8/18/2022 2346 by Alexey Sims  Outcome: Progressing     Problem: Discharge Planning  Goal: Discharge to home or other facility with appropriate resources  8/19/2022 1307 by Shruti Dick RN  Outcome: Progressing  8/18/2022 2346 by Alexey Sims  Outcome: Progressing  Flowsheets (Taken 8/18/2022 1638 by Palomo Gee RN)  Discharge to home or other facility with appropriate resources: Identify barriers to discharge with patient and caregiver     Problem: Safety - Adult  Goal: Free from fall injury  8/19/2022 1307 by Shruti Dick RN  Outcome: Progressing  8/18/2022 2346 by Alexey Sims  Outcome: Progressing     Problem: Chronic Conditions and Co-morbidities  Goal: Patient's chronic conditions and co-morbidity symptoms are monitored and maintained or improved  Outcome: Progressing

## 2022-08-19 NOTE — PROGRESS NOTES
Spoke to Dr. Laura Apple office and [facility] stated that they would not be able to see patient in the hospital. They would see her in close follow up outpatient. Patient is to call office as outpatient. [facility] NP updated.         Electronically signed by Esther Saavedra RN on 8/19/2022 at 5:00 PM

## 2022-08-19 NOTE — CARE COORDINATION
Introduced my self and provided explanation of CM role to patient. Patient is awake, alert, and aware of current diagnosis and treatment plan including ID consult, dermatology consult, po antibiotic therapy. She voices she resides at home with her spouse and completes her adl's with independence. Patient is established with a pcp. She has no retail pharmaceutical coverage and is aware of need to pay out of pocket for new prescriptions. She is hopeful for discharge in 24-48 hrs and denies post discharge needs at this time. Will follow along with  and assist with discharge planning as necessary. Rola Elizalde.  Aryan, MSN, RN  [facility] Case Management  [phone]

## 2022-08-19 NOTE — CONSULTS
[address] [place] [address] [facility]  Consultation Note     Admit Date: 8/18/2022  2:23 PM    Reason for Consult:   LLE infection    Attending Physician:  Stan Delatorre MD    HISTORY OF PRESENT ILLNESS:             The history is obtained from extensive review of available past medical records. The patient is a 79 y.o. female who is known to the ID service. Patient recently admitted to [facility] on 7/29/2022 with a left leg wound. She was on biologicals until November 2021 for Crohn disease. She had a tattoo done on the left ankle about 11 months ago. A few weeks ago the tattoo started to turn red and itch. She was treated with Ciprofloxacin with no improvement. Dermatologist gave her a steroid injection and triamcinolone to put on the wound. The wound turned red and she came to the ED for treatment. She was given Cefazolin and Doxycycline. A punch biopsy was done of the area as we suspected pyoderma gangrenosum, results reported on 8/5/2022 showed ulceration and necrosis with abscess. She was started on a steroid taper and discharged on Doxycycline. She returned to the ED at [facility] on  8/18/2022 with similar complaints of burning and pain to the left ankle. There is a wound with slough. There is erythema. She had a low grade temp in the ER of 100.6 but no other systemic signs of infection. The patient says the symptoms began to return when she completed her steroid taper 4 days ago. She was given Cefazolin and cultures were done.      Past Medical History:        Diagnosis Date    Arthritis     Depression     Diabetes mellitus ([facility] [address]     Hyperlipidemia     Hypertension     Ulcerative colitis ([facility] [address]      Past Surgical History:        Procedure Laterality Date    CARPAL TUNNEL RELEASE      CHOLECYSTECTOMY      COLECTOMY      COLONOSCOPY      KNEE ARTHROSCOPY      MN EXCIS RECTAL LESION N/A 2/13/2019    EXAMINE UNDER ANESTHESIA WITH RECTAL INCISION AND DRAINAGE performed by Gretchen Marrero Daysi Lomax MD at [place] [address] ENDOSCOPY       Current Medications:   Scheduled Meds:   sodium chloride  1,000 mL IntraVENous Once    allopurinol  300 mg Oral QAM    aspirin  81 mg Oral QAM    atorvastatin  40 mg Oral QAM    DULoxetine  60 mg Oral QAM    ergocalciferol  50,000 Units Oral Weekly    ferrous sulfate  325 mg Oral Daily with breakfast    gabapentin  100 mg Oral TID    sodium chloride flush  10 mL IntraVENous 2 times per day    enoxaparin  40 mg SubCUTAneous Daily    ceFAZolin  1,000 mg IntraVENous Q8H     Continuous Infusions:   sodium chloride 75 mL/hr at 22 9071    sodium chloride       PRN Meds:traMADol, sodium chloride flush, sodium chloride, ondansetron **OR** ondansetron, magnesium hydroxide, acetaminophen **OR** acetaminophen    Allergies:  Patient has no known allergies. Social History:   Social History     Socioeconomic History    Marital status:      Spouse name: None    Number of children: None    Years of education: None    Highest education level: None   Occupational History    Occupation: [employer]   Tobacco Use    Smoking status: Former     Packs/day: 20.00     Years: 0.50     Pack years: 10.00     Types: Cigarettes     Quit date:      Years since quittin.6    Smokeless tobacco: Never   Vaping Use    Vaping Use: Never used   Substance and Sexual Activity    Alcohol use: No    Drug use: No      Pets: None  Travel: No  The patient lives at home with her  and 70-year-old son. She works for [employer] in [place]. Family History:   History reviewed. No pertinent family history. . Otherwise non-pertinent to the chief complaint.     REVIEW OF SYSTEMS:    Constitutional: negative for fevers, chills, diaphoresis  Neurologic: Negative   Psychiatric: Negative  Rheumatologic: Negative   Endocrine: Negative  Hematologic: Negative  Immunologic: negative  ENT: Negative  Respiratory: Negative Cardiovascular: Negative  GI: hx of Crohn disease, perirectal fistulas  : Negative  Musculoskeletal: Negative  Skin: as in the HPI     PHYSICAL EXAM:    Vitals:   BP (!) 121/56   Pulse 88   Temp 98.5 °F (36.9 °C) (Oral)   Resp 16   Ht 5' 2\" (1.575 m)   Wt 195 lb (88.5 kg)   SpO2 98%   BMI 35.67 kg/m²   Constitutional: The patient is awake, alert, and oriented. In no distress  Skin: Warm and dry. No rashes were noted. HEENT: Eyes show round, and reactive pupils. No jaundice. Moist mucous membranes, no ulcerations, no thrush. Neck: Supple to movements. No lymphadenopathy. Chest: No use of accessory muscles to breathe. Symmetrical expansion. Auscultation reveals no wheezing, crackles, or rhonchi. Cardiovascular: S1 and S2 are rhythmic and regular. No murmurs appreciated. Abdomen: Positive bowel sounds to auscultation. Benign to palpation. No masses felt. No hepatosplenomegaly. Extremities: No clubbing, no cyanosis, no edema. Left ankle wound with slough,  surrounding erythema and tenderness, peeling skin.    Lines: Peripheral.            CBC+dif:  Recent Labs     08/18/22  1431 08/19/22  0325   WBC 9.4 7.0   HGB 9.0* 7.8*   HCT 30.0* 25.7*   MCV 93.8 91.8    295   NEUTROABS 7.54* 5.02     Lab Results   Component Value Date    CRP 10.2 (H) 08/18/2022    CRP 13.3 (H) 07/30/2022    CRP 12.1 (H) 07/29/2022      No results found for: CRPHS  Lab Results   Component Value Date    SEDRATE 111 (H) 08/18/2022    SEDRATE 125 (H) 07/30/2022    SEDRATE 124 (H) 07/29/2022     Lab Results   Component Value Date    ALT 16 07/29/2022    AST 14 07/29/2022    ALKPHOS 93 07/29/2022    BILITOT 0.2 07/29/2022     Lab Results   Component Value Date/Time     08/19/2022 03:25 AM    K 4.3 08/19/2022 03:25 AM     08/19/2022 03:25 AM    CO2 25 08/19/2022 03:25 AM    BUN 24 08/19/2022 03:25 AM    CREATININE 1.5 08/19/2022 03:25 AM    GFRAA 42 08/19/2022 03:25 AM    LABGLOM 35 08/19/2022 03:25 AM    GLUCOSE 189 08/19/2022 03:25 AM    PROT 7.7 07/29/2022 12:05 PM    LABALBU 3.5 07/29/2022 12:05 PM    CALCIUM 9.9 08/19/2022 03:25 AM    BILITOT 0.2 07/29/2022 12:05 PM    ALKPHOS 93 07/29/2022 12:05 PM    AST 14 07/29/2022 12:05 PM    ALT 16 07/29/2022 12:05 PM       No results found for: PROTIME, INR    Lab Results   Component Value Date/Time    TSH 2.710 10/19/2019 12:15 PM       Lab Results   Component Value Date/Time    COLORU Yellow 02/15/2018 10:00 PM    PHUR 5.5 02/15/2018 10:00 PM    WBCUA 5-10 02/15/2018 10:00 PM    RBCUA >20 02/15/2018 10:00 PM    BACTERIA FEW 02/15/2018 10:00 PM    CLARITYU Clear 02/15/2018 10:00 PM    SPECGRAV 1.010 02/15/2018 10:00 PM    LEUKOCYTESUR SMALL 02/15/2018 10:00 PM    UROBILINOGEN 0.2 02/15/2018 10:00 PM    BILIRUBINUR Negative 02/15/2018 10:00 PM    BLOODU LARGE 02/15/2018 10:00 PM    GLUCOSEU Negative 02/15/2018 10:00 PM       No results found for: HCO3, BE, O2SAT, PH, THGB, PCO2, PO2, TCO2  Radiology:  Noted    Microbiology:  Pending  No results for input(s): BC in the last 72 hours. No results for input(s): ORG in the last 72 hours. No results for input(s): Harper Kappa in the last 72 hours. No results for input(s): STREPNEUMAGU in the last 72 hours. No results for input(s): LP1UAG in the last 72 hours. No results for input(s): ASO in the last 72 hours. No results for input(s): CULTRESP in the last 72 hours. No results for input(s): PROCAL in the last 72 hours. 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 Possible cellulitis   Low grade fever associated to the above, improved    Plan:    Start Doxycycline PO  Stop Cefazolin  Suggest restarting steroids   Check cultures, baseline ESR, CRP  Monitor labs  Will follow with you    Thank you for having us see this patient in consultation.  I will be discussing this case with the treating physicians. ZEINAB Mccloud CNP  10:39 AM  8/19/2022     Patient seen and examined. I had a face to face encounter with the patient. Agree with exam.  Assessment and plan as outlined above and directed by me. Addition and corrections were done as deemed appropriate. My exam and plan include: The patient comes back to the hospital after she completed a tapering course of steroids. She improved clinically while she was on steroids. Unfortunately, pathology only showed ulceration and necrosis with abscess. I still think this could have been pyoderma gangrenosum. We will start oral Doxycycline and stop Cefazolin. She certainly needs a repeat biopsy and evaluation by dermatology. Discussed with Dr. Barbara Ramírez.     Kay Olsen MD  8/19/2022  1:04 PM

## 2022-08-20 LAB
ANION GAP SERPL CALCULATED.3IONS-SCNC: 9 MMOL/L (ref 7–16)
BUN BLDV-MCNC: 12 MG/DL (ref 6–23)
CALCIUM SERPL-MCNC: 9.3 MG/DL (ref 8.6–10.2)
CHLORIDE BLD-SCNC: 101 MMOL/L (ref 98–107)
CO2: 25 MMOL/L (ref 22–29)
CREAT SERPL-MCNC: 1 MG/DL (ref 0.5–1)
GFR AFRICAN AMERICAN: >60
GFR NON-AFRICAN AMERICAN: 55 ML/MIN/1.73
GLUCOSE BLD-MCNC: 168 MG/DL (ref 74–99)
HCT VFR BLD CALC: 26.4 % (ref 34–48)
HEMOGLOBIN: 7.8 G/DL (ref 11.5–15.5)
MCH RBC QN AUTO: 27.6 PG (ref 26–35)
MCHC RBC AUTO-ENTMCNC: 29.5 % (ref 32–34.5)
MCV RBC AUTO: 93.3 FL (ref 80–99.9)
PDW BLD-RTO: 14.5 FL (ref 11.5–15)
PLATELET # BLD: 286 E9/L (ref 130–450)
PMV BLD AUTO: 9.4 FL (ref 7–12)
POTASSIUM SERPL-SCNC: 4.2 MMOL/L (ref 3.5–5)
RBC # BLD: 2.83 E12/L (ref 3.5–5.5)
SODIUM BLD-SCNC: 135 MMOL/L (ref 132–146)
WBC # BLD: 6.1 E9/L (ref 4.5–11.5)

## 2022-08-20 PROCEDURE — 36415 COLL VENOUS BLD VENIPUNCTURE: CPT

## 2022-08-20 PROCEDURE — 6370000000 HC RX 637 (ALT 250 FOR IP): Performed by: REGISTERED NURSE

## 2022-08-20 PROCEDURE — 6360000002 HC RX W HCPCS: Performed by: INTERNAL MEDICINE

## 2022-08-20 PROCEDURE — 85027 COMPLETE CBC AUTOMATED: CPT

## 2022-08-20 PROCEDURE — 80048 BASIC METABOLIC PNL TOTAL CA: CPT

## 2022-08-20 PROCEDURE — 1200000000 HC SEMI PRIVATE

## 2022-08-20 PROCEDURE — 6370000000 HC RX 637 (ALT 250 FOR IP): Performed by: FAMILY MEDICINE

## 2022-08-20 PROCEDURE — 2580000003 HC RX 258: Performed by: FAMILY MEDICINE

## 2022-08-20 RX ORDER — METHYLPREDNISOLONE SODIUM SUCCINATE 40 MG/ML
40 INJECTION, POWDER, LYOPHILIZED, FOR SOLUTION INTRAMUSCULAR; INTRAVENOUS EVERY 12 HOURS
Status: DISCONTINUED | OUTPATIENT
Start: 2022-08-20 | End: 2022-08-22 | Stop reason: HOSPADM

## 2022-08-20 RX ADMIN — METHYLPREDNISOLONE SODIUM SUCCINATE 40 MG: 40 INJECTION, POWDER, LYOPHILIZED, FOR SOLUTION INTRAMUSCULAR; INTRAVENOUS at 18:24

## 2022-08-20 RX ADMIN — SODIUM CHLORIDE, PRESERVATIVE FREE 10 ML: 5 INJECTION INTRAVENOUS at 20:47

## 2022-08-20 RX ADMIN — FERROUS SULFATE TAB 325 MG (65 MG ELEMENTAL FE) 325 MG: 325 (65 FE) TAB at 08:33

## 2022-08-20 RX ADMIN — DOXYCYCLINE HYCLATE 100 MG: 100 CAPSULE ORAL at 20:47

## 2022-08-20 RX ADMIN — GABAPENTIN 100 MG: 100 CAPSULE ORAL at 20:47

## 2022-08-20 RX ADMIN — ATORVASTATIN CALCIUM 40 MG: 40 TABLET, FILM COATED ORAL at 08:33

## 2022-08-20 RX ADMIN — ACETAMINOPHEN 650 MG: 325 TABLET ORAL at 16:09

## 2022-08-20 RX ADMIN — GABAPENTIN 100 MG: 100 CAPSULE ORAL at 08:33

## 2022-08-20 RX ADMIN — ACETAMINOPHEN 650 MG: 325 TABLET ORAL at 08:35

## 2022-08-20 RX ADMIN — DOXYCYCLINE HYCLATE 100 MG: 100 CAPSULE ORAL at 08:33

## 2022-08-20 RX ADMIN — TRAMADOL HYDROCHLORIDE 50 MG: 50 TABLET, COATED ORAL at 14:38

## 2022-08-20 RX ADMIN — DULOXETINE HYDROCHLORIDE 60 MG: 60 CAPSULE, DELAYED RELEASE ORAL at 08:33

## 2022-08-20 RX ADMIN — ALLOPURINOL 300 MG: 300 TABLET ORAL at 08:33

## 2022-08-20 RX ADMIN — SODIUM CHLORIDE, PRESERVATIVE FREE 10 ML: 5 INJECTION INTRAVENOUS at 18:24

## 2022-08-20 RX ADMIN — SODIUM CHLORIDE: 9 INJECTION, SOLUTION INTRAVENOUS at 05:31

## 2022-08-20 RX ADMIN — ACETAMINOPHEN 650 MG: 325 TABLET ORAL at 01:00

## 2022-08-20 RX ADMIN — GABAPENTIN 100 MG: 100 CAPSULE ORAL at 14:35

## 2022-08-20 RX ADMIN — ASPIRIN 81 MG CHEWABLE TABLET 81 MG: 81 TABLET CHEWABLE at 08:33

## 2022-08-20 ASSESSMENT — PAIN SCALES - GENERAL
PAINLEVEL_OUTOF10: 3
PAINLEVEL_OUTOF10: 1
PAINLEVEL_OUTOF10: 1
PAINLEVEL_OUTOF10: 3
PAINLEVEL_OUTOF10: 5
PAINLEVEL_OUTOF10: 6

## 2022-08-20 ASSESSMENT — PAIN DESCRIPTION - ORIENTATION
ORIENTATION: LEFT

## 2022-08-20 ASSESSMENT — PAIN DESCRIPTION - LOCATION
LOCATION: LEG
LOCATION: LEG
LOCATION: ANKLE
LOCATION: LEG;FOOT

## 2022-08-20 ASSESSMENT — PAIN DESCRIPTION - DESCRIPTORS
DESCRIPTORS: BURNING
DESCRIPTORS: SORE;DISCOMFORT;DULL
DESCRIPTORS: BURNING

## 2022-08-20 ASSESSMENT — PAIN - FUNCTIONAL ASSESSMENT
PAIN_FUNCTIONAL_ASSESSMENT: ACTIVITIES ARE NOT PREVENTED
PAIN_FUNCTIONAL_ASSESSMENT: ACTIVITIES ARE NOT PREVENTED
PAIN_FUNCTIONAL_ASSESSMENT: PREVENTS OR INTERFERES SOME ACTIVE ACTIVITIES AND ADLS

## 2022-08-20 NOTE — PLAN OF CARE
Problem: Pain  Goal: Verbalizes/displays adequate comfort level or baseline comfort level  8/20/2022 1039 by Adriano Campuzano RN  Outcome: Progressing  8/19/2022 2130 by Amber Ahn  Outcome: Progressing     Problem: Discharge Planning  Goal: Discharge to home or other facility with appropriate resources  8/20/2022 1039 by Adriano Campuzano RN  Outcome: Progressing  8/19/2022 2130 by Amber Ahn  Outcome: Progressing     Problem: Safety - Adult  Goal: Free from fall injury  8/20/2022 1039 by Adriano Campuzano RN  Outcome: Progressing  8/19/2022 2130 by Amber Ahn  Outcome: Progressing     Problem: Chronic Conditions and Co-morbidities  Goal: Patient's chronic conditions and co-morbidity symptoms are monitored and maintained or improved  8/20/2022 1039 by Adriano Campuzano RN  Outcome: Progressing  8/19/2022 2130 by Amber Ahn  Outcome: Progressing     Problem: ABCDS Injury Assessment  Goal: Absence of physical injury  Outcome: Progressing

## 2022-08-20 NOTE — PROGRESS NOTES
[facility]   Progress note      Subjective: The patient is awake and alert. No acute events overnight. Denies chest pain, angina, SOB     Objective:    /63   Pulse 68   Temp 99.1 °F (37.3 °C) (Oral)   Resp 18   Ht 5' 2\" (1.575 m)   Wt 205 lb 6 oz (93.2 kg)   SpO2 98%   BMI 37.56 kg/m²     In: 2563.1 [P.O.:840; I.V.:1723.1]  Out: -   In: 2563.1   Out: -     General appearance: NAD, conversant  HEENT: AT/NC, MMM  Neck: FROM, supple  Lungs: Clear to auscultation  CV: RRR, no MRGs  Vasc: Radial pulses 2+  Abdomen: Soft, non-tender; no masses or HSM  Extremities: No peripheral edema or digital cyanosis  Skin: no rash, lesions or ulcers  Psych: Alert and oriented to person, place and time  Neuro: Alert and interactive     Recent Labs     08/18/22  1431 08/19/22  0325 08/20/22  0600   WBC 9.4 7.0 6.1   HGB 9.0* 7.8* 7.8*   HCT 30.0* 25.7* 26.4*    295 286       Recent Labs     08/18/22  1431 08/19/22  0325 08/20/22  0600    134 135   K 4.0 4.3 4.2   CL 94* 100 101   CO2 26 25 25   BUN 25* 24* 12   CREATININE 1.7* 1.5* 1.0   CALCIUM 10.5* 9.9 9.3       Assessment:    Principal Problem:    Cellulitis  Resolved Problems:    * No resolved hospital problems.  *      Plan:    55-year-old female with a history of cellulitis to the left ankle treated in July presents to the ED with concern for recurrence and is admitted to [place] [address] unit with    Cellulitis/open wound at site of previous tattoo status post recent treatment with antibiotics and short burst of steroids  Monitor labs - WBC 11.0  Sed rate 111  CRP 10.2  Currently on doxycycline 100 p.o. twice daily  Consult ID - await input  Pyoderma gangrenosum was working diagnosis on previous admission given patient's existing inflammatory bowel disease, she was placed on steroid therapy with taper at home-however secondary to no significant improvement she now returns  Initiate steroids 40 IV twice daily until evaluation by dermatology, and subsequently transition to p.o.   Case discussed with Dr. Meléndez Russian consult for further recommendations-pending  Blood cultures pending    Anemia  Likely dilutional  No evidence of acute blood loss    KARAN on CKD  -Monitor labs  -25/1.7 > 24/1.5, baseline 18/1.1-resolved  IV hydration can be transitioned just to p.o. encouraging fluid  -Hold nephrotoxins    Diabetes Mellitus  -Monitor labs  -ISS glucose control  -Hypoglycemia protocol initiated     DVT Prophylaxis   PT/OT  Discharge Luz Elena Mcneal MD  6:04 PM  8/20/2022

## 2022-08-20 NOTE — PROGRESS NOTES
[address] [place] [address] [facility]  Progress Note    SUBJECTIVE:  Chief Complaint   Patient presents with    Wound Check     Infected tattoo      Patient is tolerating medications. No nausea, vomiting, diarrhea. Laying in bed, L foot elevated on pillow  She feels like the swelling is worse    Review of systems:  As stated above in the chief complaint, otherwise negative. Medications:  Scheduled Meds:   doxycycline hyclate  100 mg Oral 2 times per day    sodium chloride  1,000 mL IntraVENous Once    allopurinol  300 mg Oral QAM    aspirin  81 mg Oral QAM    atorvastatin  40 mg Oral QAM    DULoxetine  60 mg Oral QAM    ergocalciferol  50,000 Units Oral Weekly    ferrous sulfate  325 mg Oral Daily with breakfast    gabapentin  100 mg Oral TID    sodium chloride flush  10 mL IntraVENous 2 times per day    enoxaparin  40 mg SubCUTAneous Daily     Continuous Infusions:   sodium chloride 75 mL/hr at 22 0531    sodium chloride       PRN Meds:traMADol, sodium chloride flush, sodium chloride, ondansetron **OR** ondansetron, magnesium hydroxide, acetaminophen **OR** acetaminophen    OBJECTIVE:  /63   Pulse 68   Temp 99.1 °F (37.3 °C) (Oral)   Resp 17   Ht 5' 2\" (1.575 m)   Wt 205 lb 6 oz (93.2 kg)   SpO2 98%   BMI 37.56 kg/m²   Temp  Av.2 °F (37.3 °C)  Min: 99.1 °F (37.3 °C)  Max: 99.3 °F (37.4 °C)  Constitutional: The patient is awake, alert, and oriented. Skin: Warm and dry. No rashes were noted. HEENT: Round and reactive pupils. Moist mucous membranes. No ulcerations or thrush. Neck: Supple to movements. Chest: No use of accessory muscles to breathe. Symmetrical expansion. No wheezing, crackles or rhonchi. Cardiovascular: S1 and S2 are rhythmic and regular. No murmurs appreciated. Abdomen: Positive bowel sounds to auscultation. Benign to palpation. No masses felt. No hepatosplenomegaly.   Extremities:  Left ankle wound with slough,  surrounding erythema and tenderness, peeling skin, red raised areas with drainage - se image below   Lines: peripheral    8/18/2022:      8/20/2022:      Laboratory and Tests Review:  Lab Results   Component Value Date    WBC 6.1 08/20/2022    WBC 7.0 08/19/2022    WBC 9.4 08/18/2022    HGB 7.8 (L) 08/20/2022    HCT 26.4 (L) 08/20/2022    MCV 93.3 08/20/2022     08/20/2022     Lab Results   Component Value Date    NEUTROABS 5.02 08/19/2022    NEUTROABS 7.54 (H) 08/18/2022    NEUTROABS 9.69 (H) 08/02/2022     No results found for: [facility]  Lab Results   Component Value Date    ALT 16 07/29/2022    AST 14 07/29/2022    ALKPHOS 93 07/29/2022    BILITOT 0.2 07/29/2022     Lab Results   Component Value Date/Time     08/20/2022 06:00 AM    K 4.2 08/20/2022 06:00 AM    K 4.3 08/19/2022 03:25 AM     08/20/2022 06:00 AM    CO2 25 08/20/2022 06:00 AM    BUN 12 08/20/2022 06:00 AM    CREATININE 1.0 08/20/2022 06:00 AM    CREATININE 1.5 08/19/2022 03:25 AM    CREATININE 1.7 08/18/2022 02:31 PM    GFRAA >60 08/20/2022 06:00 AM    LABGLOM 55 08/20/2022 06:00 AM    GLUCOSE 168 08/20/2022 06:00 AM    PROT 7.7 07/29/2022 12:05 PM    LABALBU 3.5 07/29/2022 12:05 PM    CALCIUM 9.3 08/20/2022 06:00 AM    BILITOT 0.2 07/29/2022 12:05 PM    ALKPHOS 93 07/29/2022 12:05 PM    AST 14 07/29/2022 12:05 PM    ALT 16 07/29/2022 12:05 PM     Lab Results   Component Value Date    CRP 10.2 (H) 08/18/2022    CRP 13.3 (H) 07/30/2022    CRP 12.1 (H) 07/29/2022     Lab Results   Component Value Date    SEDRATE 111 (H) 08/18/2022    SEDRATE 125 (H) 07/30/2022    SEDRATE 124 (H) 07/29/2022     Radiology:      Microbiology:   Blood Cultures 8/18/22: negative so far  Wound Culture 8/18/22: pending     ASSESSMENT:  Left ankle skin lesion, 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   Possible cellulitis   Low grade fever associated to the above,

## 2022-08-21 LAB
ANAEROBIC CULTURE: NORMAL
ANION GAP SERPL CALCULATED.3IONS-SCNC: 7 MMOL/L (ref 7–16)
BUN BLDV-MCNC: 17 MG/DL (ref 6–23)
CALCIUM SERPL-MCNC: 9.5 MG/DL (ref 8.6–10.2)
CHLORIDE BLD-SCNC: 101 MMOL/L (ref 98–107)
CO2: 24 MMOL/L (ref 22–29)
CREAT SERPL-MCNC: 1 MG/DL (ref 0.5–1)
GFR AFRICAN AMERICAN: >60
GFR NON-AFRICAN AMERICAN: 55 ML/MIN/1.73
GLUCOSE BLD-MCNC: 306 MG/DL (ref 74–99)
HCT VFR BLD CALC: 24.8 % (ref 34–48)
HEMOGLOBIN: 7.7 G/DL (ref 11.5–15.5)
MCH RBC QN AUTO: 28 PG (ref 26–35)
MCHC RBC AUTO-ENTMCNC: 31 % (ref 32–34.5)
MCV RBC AUTO: 90.2 FL (ref 80–99.9)
METER GLUCOSE: 312 MG/DL (ref 74–99)
METER GLUCOSE: 338 MG/DL (ref 74–99)
METER GLUCOSE: 467 MG/DL (ref 74–99)
PDW BLD-RTO: 14.4 FL (ref 11.5–15)
PLATELET # BLD: 303 E9/L (ref 130–450)
PMV BLD AUTO: 9.6 FL (ref 7–12)
POTASSIUM SERPL-SCNC: 5.1 MMOL/L (ref 3.5–5)
RBC # BLD: 2.75 E12/L (ref 3.5–5.5)
SODIUM BLD-SCNC: 132 MMOL/L (ref 132–146)
WBC # BLD: 6.9 E9/L (ref 4.5–11.5)
WOUND/ABSCESS: NORMAL

## 2022-08-21 PROCEDURE — 36415 COLL VENOUS BLD VENIPUNCTURE: CPT

## 2022-08-21 PROCEDURE — 6370000000 HC RX 637 (ALT 250 FOR IP): Performed by: INTERNAL MEDICINE

## 2022-08-21 PROCEDURE — 1200000000 HC SEMI PRIVATE

## 2022-08-21 PROCEDURE — 85027 COMPLETE CBC AUTOMATED: CPT

## 2022-08-21 PROCEDURE — 6360000002 HC RX W HCPCS: Performed by: INTERNAL MEDICINE

## 2022-08-21 PROCEDURE — 82962 GLUCOSE BLOOD TEST: CPT

## 2022-08-21 PROCEDURE — 6370000000 HC RX 637 (ALT 250 FOR IP): Performed by: REGISTERED NURSE

## 2022-08-21 PROCEDURE — 6370000000 HC RX 637 (ALT 250 FOR IP): Performed by: FAMILY MEDICINE

## 2022-08-21 PROCEDURE — 2580000003 HC RX 258: Performed by: FAMILY MEDICINE

## 2022-08-21 PROCEDURE — 80048 BASIC METABOLIC PNL TOTAL CA: CPT

## 2022-08-21 RX ORDER — DEXTROSE MONOHYDRATE 100 MG/ML
INJECTION, SOLUTION INTRAVENOUS CONTINUOUS PRN
Status: DISCONTINUED | OUTPATIENT
Start: 2022-08-21 | End: 2022-08-22 | Stop reason: HOSPADM

## 2022-08-21 RX ORDER — INSULIN LISPRO 100 [IU]/ML
0-4 INJECTION, SOLUTION INTRAVENOUS; SUBCUTANEOUS NIGHTLY
Status: DISCONTINUED | OUTPATIENT
Start: 2022-08-21 | End: 2022-08-22 | Stop reason: HOSPADM

## 2022-08-21 RX ORDER — INSULIN LISPRO 100 [IU]/ML
0-8 INJECTION, SOLUTION INTRAVENOUS; SUBCUTANEOUS
Status: DISCONTINUED | OUTPATIENT
Start: 2022-08-21 | End: 2022-08-22 | Stop reason: HOSPADM

## 2022-08-21 RX ADMIN — SODIUM CHLORIDE, PRESERVATIVE FREE 10 ML: 5 INJECTION INTRAVENOUS at 21:37

## 2022-08-21 RX ADMIN — SODIUM CHLORIDE, PRESERVATIVE FREE 10 ML: 5 INJECTION INTRAVENOUS at 18:12

## 2022-08-21 RX ADMIN — DOXYCYCLINE HYCLATE 100 MG: 100 CAPSULE ORAL at 08:47

## 2022-08-21 RX ADMIN — METHYLPREDNISOLONE SODIUM SUCCINATE 40 MG: 40 INJECTION, POWDER, LYOPHILIZED, FOR SOLUTION INTRAMUSCULAR; INTRAVENOUS at 05:49

## 2022-08-21 RX ADMIN — INSULIN LISPRO 6 UNITS: 100 INJECTION, SOLUTION INTRAVENOUS; SUBCUTANEOUS at 16:16

## 2022-08-21 RX ADMIN — FERROUS SULFATE TAB 325 MG (65 MG ELEMENTAL FE) 325 MG: 325 (65 FE) TAB at 08:47

## 2022-08-21 RX ADMIN — ALLOPURINOL 300 MG: 300 TABLET ORAL at 08:47

## 2022-08-21 RX ADMIN — DULOXETINE HYDROCHLORIDE 60 MG: 60 CAPSULE, DELAYED RELEASE ORAL at 08:47

## 2022-08-21 RX ADMIN — GABAPENTIN 100 MG: 100 CAPSULE ORAL at 14:38

## 2022-08-21 RX ADMIN — INSULIN LISPRO 4 UNITS: 100 INJECTION, SOLUTION INTRAVENOUS; SUBCUTANEOUS at 21:37

## 2022-08-21 RX ADMIN — SODIUM CHLORIDE, PRESERVATIVE FREE 10 ML: 5 INJECTION INTRAVENOUS at 08:48

## 2022-08-21 RX ADMIN — GABAPENTIN 100 MG: 100 CAPSULE ORAL at 08:47

## 2022-08-21 RX ADMIN — ASPIRIN 81 MG CHEWABLE TABLET 81 MG: 81 TABLET CHEWABLE at 08:47

## 2022-08-21 RX ADMIN — GABAPENTIN 100 MG: 100 CAPSULE ORAL at 21:36

## 2022-08-21 RX ADMIN — INSULIN LISPRO 6 UNITS: 100 INJECTION, SOLUTION INTRAVENOUS; SUBCUTANEOUS at 11:19

## 2022-08-21 RX ADMIN — METHYLPREDNISOLONE SODIUM SUCCINATE 40 MG: 40 INJECTION, POWDER, LYOPHILIZED, FOR SOLUTION INTRAMUSCULAR; INTRAVENOUS at 18:14

## 2022-08-21 RX ADMIN — ATORVASTATIN CALCIUM 40 MG: 40 TABLET, FILM COATED ORAL at 08:47

## 2022-08-21 RX ADMIN — DOXYCYCLINE HYCLATE 100 MG: 100 CAPSULE ORAL at 21:36

## 2022-08-21 ASSESSMENT — PAIN SCALES - GENERAL
PAINLEVEL_OUTOF10: 0
PAINLEVEL_OUTOF10: 0

## 2022-08-21 NOTE — PLAN OF CARE
Problem: Pain  Goal: Verbalizes/displays adequate comfort level or baseline comfort level  Outcome: Progressing     Problem: Safety - Adult  Goal: Free from fall injury  Outcome: Progressing     Problem: Chronic Conditions and Co-morbidities  Goal: Patient's chronic conditions and co-morbidity symptoms are monitored and maintained or improved  Outcome: Progressing     Problem: ABCDS Injury Assessment  Goal: Absence of physical injury  Outcome: Progressing

## 2022-08-21 NOTE — PLAN OF CARE
Problem: Pain  Goal: Verbalizes/displays adequate comfort level or baseline comfort level  8/20/2022 2247 by Dorcas Rodriguez RN  Outcome: Progressing     Problem: Discharge Planning  Goal: Discharge to home or other facility with appropriate resources  8/20/2022 2247 by Dorcas Rodriguez RN  Outcome: Progressing     Problem: Safety - Adult  Goal: Free from fall injury  8/20/2022 2247 by Dorcas Rodriguez RN  Outcome: Progressing     Problem: Chronic Conditions and Co-morbidities  Goal: Patient's chronic conditions and co-morbidity symptoms are monitored and maintained or improved  8/20/2022 2247 by Dorcas Rodriguez RN  Outcome: Progressing     Problem: ABCDS Injury Assessment  Goal: Absence of physical injury  8/20/2022 2247 by Dorcas Rodriguez RN  Outcome: Progressing

## 2022-08-21 NOTE — PROGRESS NOTES
[address] [place] [address] [facility]  Progress Note    SUBJECTIVE:  Chief Complaint   Patient presents with    Wound Check     Infected tattoo      The patient has less pain. She is tolerating steroids and oral antibiotic. Review of systems:  As stated above in the chief complaint, otherwise negative. Medications:  Scheduled Meds:   insulin lispro  0-8 Units SubCUTAneous TID WC    insulin lispro  0-4 Units SubCUTAneous Nightly    methylPREDNISolone  40 mg IntraVENous Q12H    doxycycline hyclate  100 mg Oral 2 times per day    sodium chloride  1,000 mL IntraVENous Once    allopurinol  300 mg Oral QAM    aspirin  81 mg Oral QAM    atorvastatin  40 mg Oral QAM    DULoxetine  60 mg Oral QAM    ergocalciferol  50,000 Units Oral Weekly    ferrous sulfate  325 mg Oral Daily with breakfast    gabapentin  100 mg Oral TID    sodium chloride flush  10 mL IntraVENous 2 times per day    enoxaparin  40 mg SubCUTAneous Daily     Continuous Infusions:   dextrose      sodium chloride       PRN Meds:glucose, dextrose bolus **OR** dextrose bolus, glucagon (rDNA), dextrose, traMADol, sodium chloride flush, sodium chloride, ondansetron **OR** ondansetron, magnesium hydroxide, acetaminophen **OR** acetaminophen    OBJECTIVE:  /60   Pulse 75   Temp 99.3 °F (37.4 °C) (Oral)   Resp 18   Ht 5' 2\" (1.575 m)   Wt 210 lb (95.3 kg)   SpO2 96%   BMI 38.41 kg/m²   Temp  Av.3 °F (37.4 °C)  Min: 99.3 °F (37.4 °C)  Max: 99.3 °F (37.4 °C)  Constitutional: The patient is awake, alert, and oriented. Skin: Warm and dry. No rashes were noted. HEENT: Round and reactive pupils. Moist mucous membranes. No ulcerations or thrush. Neck: Supple to movements. Chest: No use of accessory muscles to breathe. Symmetrical expansion. No wheezing, crackles or rhonchi. Cardiovascular: S1 and S2 are rhythmic and regular. No murmurs appreciated. Abdomen: Positive bowel sounds to auscultation. Benign to palpation.  No masses felt. No hepatosplenomegaly. Extremities: There is less redness. The pustules are still seeping red/purulent fluid.   Lines: peripheral    8/18/2022:      8/20/2022:      Laboratory and Tests Review:  Lab Results   Component Value Date    WBC 6.9 08/21/2022    WBC 6.1 08/20/2022    WBC 7.0 08/19/2022    HGB 7.7 (L) 08/21/2022    HCT 24.8 (L) 08/21/2022    MCV 90.2 08/21/2022     08/21/2022     Lab Results   Component Value Date    NEUTROABS 5.02 08/19/2022    NEUTROABS 7.54 (H) 08/18/2022    NEUTROABS 9.69 (H) 08/02/2022     No results found for: [facility]  Lab Results   Component Value Date    ALT 16 07/29/2022    AST 14 07/29/2022    ALKPHOS 93 07/29/2022    BILITOT 0.2 07/29/2022     Lab Results   Component Value Date/Time     08/21/2022 05:51 AM    K 5.1 08/21/2022 05:51 AM    K 4.3 08/19/2022 03:25 AM     08/21/2022 05:51 AM    CO2 24 08/21/2022 05:51 AM    BUN 17 08/21/2022 05:51 AM    CREATININE 1.0 08/21/2022 05:51 AM    CREATININE 1.0 08/20/2022 06:00 AM    CREATININE 1.5 08/19/2022 03:25 AM    GFRAA >60 08/21/2022 05:51 AM    LABGLOM 55 08/21/2022 05:51 AM    GLUCOSE 306 08/21/2022 05:51 AM    PROT 7.7 07/29/2022 12:05 PM    LABALBU 3.5 07/29/2022 12:05 PM    CALCIUM 9.5 08/21/2022 05:51 AM    BILITOT 0.2 07/29/2022 12:05 PM    ALKPHOS 93 07/29/2022 12:05 PM    AST 14 07/29/2022 12:05 PM    ALT 16 07/29/2022 12:05 PM     Lab Results   Component Value Date    CRP 10.2 (H) 08/18/2022    CRP 13.3 (H) 07/30/2022    CRP 12.1 (H) 07/29/2022     Lab Results   Component Value Date    SEDRATE 111 (H) 08/18/2022    SEDRATE 125 (H) 07/30/2022    SEDRATE 124 (H) 07/29/2022     Radiology:      Microbiology:   Blood Cultures 8/18/22: negative so far  Wound Culture 8/18/22: GNR    ASSESSMENT:  Probable pyoderma gangrenosum left ankle  Possible cellulitis, improving, mostly because of steroids  Low grade fever associated to the above, improved    PLAN:  Continue Doxycycline PO  Check final

## 2022-08-21 NOTE — PROGRESS NOTES
[facility]   Progress note      Subjective: The patient is awake and alert. She notes less burning and pain in left ankle today    Objective:    /60   Pulse 75   Temp 99.3 °F (37.4 °C) (Oral)   Resp 18   Ht 5' 2\" (1.575 m)   Wt 210 lb (95.3 kg)   SpO2 96%   BMI 38.41 kg/m²     In: 240 [P.O.:240]  Out: -   In: 240   Out: -     General appearance: NAD, conversant  HEENT: AT/NC, MMM  Neck: FROM, supple  Lungs: Clear to auscultation  CV: RRR, no MRGs  Vasc: Radial pulses 2+  Abdomen: Soft, non-tender; no masses or HSM  Extremities left ankle in dressing  Skin: no rash, lesions or ulcers  Psych: Alert and oriented to person, place and time  Neuro: Alert and interactive     Recent Labs     08/19/22  0325 08/20/22  0600 08/21/22  0551   WBC 7.0 6.1 6.9   HGB 7.8* 7.8* 7.7*   HCT 25.7* 26.4* 24.8*    286 303       Recent Labs     08/19/22  0325 08/20/22  0600 08/21/22  0551    135 132   K 4.3 4.2 5.1*    101 101   CO2 25 25 24   BUN 24* 12 17   CREATININE 1.5* 1.0 1.0   CALCIUM 9.9 9.3 9.5       Assessment:    Principal Problem:    Cellulitis  Resolved Problems:    * No resolved hospital problems. *      Plan:    78-year-old female with a history of cellulitis to the left ankle treated in July presents to the ED with concern for recurrence and is admitted to [place] [address] unit with    Cellulitis/open wound at site of previous tattoo status post recent treatment with antibiotics and short burst of steroids  Monitor labs - WBC 11.0-- 6.9 today   Sed rate 111  CRP 10.2  Currently on doxycycline 100 p.o. twice daily  Pyoderma gangrenosum was working diagnosis on previous admission given patient's existing inflammatory bowel disease, she was placed on steroid therapy with taper at home-however secondary to no significant improvement she now returns  steroids 40 IV twice daily until evaluation by dermatology, and subsequently transition to p.o.   Case discussed with  Asa Mixer consult for further recommendations-pending  Blood cultures pending    Anemia  Likely dilutional  No evidence of acute blood loss    KARAN   -Monitor labs  -25/1.7 > 24/1.5, baseline 18/1.1-resolved  IV hydration can be transitioned just to p.o. encouraging fluid  -Hold nephrotoxins    Diabetes Mellitus  - elevated now d/t steroids - increase iss to medium dose   -Monitor labs  -ISS glucose control  -Hypoglycemia protocol initiated     DVT Prophylaxis   PT/OT  Discharge planning           Sean Carson MD  9:15 AM  8/21/2022

## 2022-08-22 VITALS
DIASTOLIC BLOOD PRESSURE: 63 MMHG | HEIGHT: 62 IN | OXYGEN SATURATION: 99 % | HEART RATE: 67 BPM | RESPIRATION RATE: 16 BRPM | BODY MASS INDEX: 38.64 KG/M2 | SYSTOLIC BLOOD PRESSURE: 162 MMHG | WEIGHT: 210 LBS | TEMPERATURE: 97.6 F

## 2022-08-22 LAB
ANION GAP SERPL CALCULATED.3IONS-SCNC: 11 MMOL/L (ref 7–16)
BUN BLDV-MCNC: 24 MG/DL (ref 6–23)
CALCIUM SERPL-MCNC: 10.2 MG/DL (ref 8.6–10.2)
CHLORIDE BLD-SCNC: 101 MMOL/L (ref 98–107)
CO2: 21 MMOL/L (ref 22–29)
CREAT SERPL-MCNC: 1.1 MG/DL (ref 0.5–1)
GFR AFRICAN AMERICAN: 60
GFR NON-AFRICAN AMERICAN: 49 ML/MIN/1.73
GLUCOSE BLD-MCNC: 337 MG/DL (ref 74–99)
HCT VFR BLD CALC: 25 % (ref 34–48)
HEMOGLOBIN: 7.8 G/DL (ref 11.5–15.5)
MCH RBC QN AUTO: 28.9 PG (ref 26–35)
MCHC RBC AUTO-ENTMCNC: 31.2 % (ref 32–34.5)
MCV RBC AUTO: 92.6 FL (ref 80–99.9)
METER GLUCOSE: 226 MG/DL (ref 74–99)
METER GLUCOSE: 312 MG/DL (ref 74–99)
METER GLUCOSE: 318 MG/DL (ref 74–99)
METER GLUCOSE: 438 MG/DL (ref 74–99)
PDW BLD-RTO: 14 FL (ref 11.5–15)
PLATELET # BLD: 315 E9/L (ref 130–450)
PMV BLD AUTO: 9.8 FL (ref 7–12)
POTASSIUM SERPL-SCNC: 5 MMOL/L (ref 3.5–5)
RBC # BLD: 2.7 E12/L (ref 3.5–5.5)
SODIUM BLD-SCNC: 133 MMOL/L (ref 132–146)
WBC # BLD: 9 E9/L (ref 4.5–11.5)

## 2022-08-22 PROCEDURE — 36415 COLL VENOUS BLD VENIPUNCTURE: CPT

## 2022-08-22 PROCEDURE — 80048 BASIC METABOLIC PNL TOTAL CA: CPT

## 2022-08-22 PROCEDURE — 6370000000 HC RX 637 (ALT 250 FOR IP): Performed by: FAMILY MEDICINE

## 2022-08-22 PROCEDURE — 82962 GLUCOSE BLOOD TEST: CPT

## 2022-08-22 PROCEDURE — 6370000000 HC RX 637 (ALT 250 FOR IP): Performed by: INTERNAL MEDICINE

## 2022-08-22 PROCEDURE — 2580000003 HC RX 258: Performed by: FAMILY MEDICINE

## 2022-08-22 PROCEDURE — 6360000002 HC RX W HCPCS: Performed by: INTERNAL MEDICINE

## 2022-08-22 PROCEDURE — 6370000000 HC RX 637 (ALT 250 FOR IP): Performed by: PHYSICIAN ASSISTANT

## 2022-08-22 PROCEDURE — 85027 COMPLETE CBC AUTOMATED: CPT

## 2022-08-22 PROCEDURE — 6370000000 HC RX 637 (ALT 250 FOR IP): Performed by: REGISTERED NURSE

## 2022-08-22 RX ORDER — PREDNISONE 10 MG/1
TABLET ORAL
Qty: 30 TABLET | Refills: 0 | Status: SHIPPED | OUTPATIENT
Start: 2022-08-22

## 2022-08-22 RX ORDER — AMLODIPINE BESYLATE 5 MG/1
5 TABLET ORAL DAILY
Qty: 30 TABLET | Refills: 3 | Status: SHIPPED | OUTPATIENT
Start: 2022-08-22

## 2022-08-22 RX ORDER — DOXYCYCLINE HYCLATE 100 MG/1
100 CAPSULE ORAL EVERY 12 HOURS SCHEDULED
Qty: 14 CAPSULE | Refills: 0 | Status: SHIPPED | OUTPATIENT
Start: 2022-08-22 | End: 2022-08-29

## 2022-08-22 RX ADMIN — Medication 10 UNITS: at 01:20

## 2022-08-22 RX ADMIN — ACETAMINOPHEN 650 MG: 325 TABLET ORAL at 13:26

## 2022-08-22 RX ADMIN — DULOXETINE HYDROCHLORIDE 60 MG: 60 CAPSULE, DELAYED RELEASE ORAL at 08:35

## 2022-08-22 RX ADMIN — ALLOPURINOL 300 MG: 300 TABLET ORAL at 08:35

## 2022-08-22 RX ADMIN — INSULIN LISPRO 2 UNITS: 100 INJECTION, SOLUTION INTRAVENOUS; SUBCUTANEOUS at 07:23

## 2022-08-22 RX ADMIN — SODIUM CHLORIDE, PRESERVATIVE FREE 10 ML: 5 INJECTION INTRAVENOUS at 08:36

## 2022-08-22 RX ADMIN — GABAPENTIN 100 MG: 100 CAPSULE ORAL at 13:45

## 2022-08-22 RX ADMIN — GABAPENTIN 100 MG: 100 CAPSULE ORAL at 08:36

## 2022-08-22 RX ADMIN — FERROUS SULFATE TAB 325 MG (65 MG ELEMENTAL FE) 325 MG: 325 (65 FE) TAB at 08:36

## 2022-08-22 RX ADMIN — INSULIN LISPRO 6 UNITS: 100 INJECTION, SOLUTION INTRAVENOUS; SUBCUTANEOUS at 10:52

## 2022-08-22 RX ADMIN — METHYLPREDNISOLONE SODIUM SUCCINATE 40 MG: 40 INJECTION, POWDER, LYOPHILIZED, FOR SOLUTION INTRAMUSCULAR; INTRAVENOUS at 06:55

## 2022-08-22 RX ADMIN — ATORVASTATIN CALCIUM 40 MG: 40 TABLET, FILM COATED ORAL at 08:35

## 2022-08-22 RX ADMIN — ASPIRIN 81 MG CHEWABLE TABLET 81 MG: 81 TABLET CHEWABLE at 08:35

## 2022-08-22 RX ADMIN — DOXYCYCLINE HYCLATE 100 MG: 100 CAPSULE ORAL at 08:35

## 2022-08-22 NOTE — PROGRESS NOTES
Contacted whom ever is covering for Dr. Damaso Hirsch and left a message about the patients bs which was [phone]. Orders were to give the 4 units of humalog and contact provider. Will await further orders.   Electronically signed by Anthony Barakat RN on 8/21/2022 at 9:53 PM

## 2022-08-22 NOTE — PROGRESS NOTES
Hospitalist Progress Note      PCP: Milena Crews MD    Date of Admission: 8/18/2022        Hospital Course:  RASH ON LLE, THOUGHT TO HAVE PYODERMA GANGRENOSUM, ON DOXY AND STEROIDS  TO FOLLOW UP WITH DERM AS AN OP        Subjective: WANTS TO GO HOME          Medications:  Reviewed    Infusion Medications    dextrose      sodium chloride       Scheduled Medications    insulin lispro  0-8 Units SubCUTAneous TID WC    insulin lispro  0-4 Units SubCUTAneous Nightly    methylPREDNISolone  40 mg IntraVENous Q12H    doxycycline hyclate  100 mg Oral 2 times per day    sodium chloride  1,000 mL IntraVENous Once    allopurinol  300 mg Oral QAM    aspirin  81 mg Oral QAM    atorvastatin  40 mg Oral QAM    DULoxetine  60 mg Oral QAM    ergocalciferol  50,000 Units Oral Weekly    ferrous sulfate  325 mg Oral Daily with breakfast    gabapentin  100 mg Oral TID    sodium chloride flush  10 mL IntraVENous 2 times per day    enoxaparin  40 mg SubCUTAneous Daily     PRN Meds: glucose, dextrose bolus **OR** dextrose bolus, glucagon (rDNA), dextrose, traMADol, sodium chloride flush, sodium chloride, ondansetron **OR** ondansetron, magnesium hydroxide, acetaminophen **OR** acetaminophen      Intake/Output Summary (Last 24 hours) at 8/22/2022 1637  Last data filed at 8/22/2022 1139  Gross per 24 hour   Intake 840 ml   Output 150 ml   Net 690 ml       Exam:    BP (!) 162/63   Pulse 67   Temp 97.6 °F (36.4 °C) (Oral)   Resp 16   Ht 5' 2\" (1.575 m)   Wt 210 lb (95.3 kg)   SpO2 99%   BMI 38.41 kg/m²           Gen:WELL DEVELOPED  HEENT: NC/AT, moist mucous membranes, no oropharyngeal erythema or exudate  Neck: supple, trachea midline, no anterior cervical or SC LAD  Heart:  Normal s1/s2, RRR,  Lungs:   bilaterally,  Abd: bowel sounds present, soft, nontender, nondistended, no masses  Extrem:  No clubbing, cyanosis,  POS edema LLE DSD INTACT  Skin: no rashes or lesions  Psych: A & O x3  Neuro: grossly intact, moves all four extremities. Labs:   Recent Labs     08/20/22  0600 08/21/22  0551 08/22/22  0313   WBC 6.1 6.9 9.0   HGB 7.8* 7.7* 7.8*   HCT 26.4* 24.8* 25.0*    303 315     Recent Labs     08/20/22  0600 08/21/22  0551 08/22/22  0313    132 133   K 4.2 5.1* 5.0    101 101   CO2 25 24 21*   BUN 12 17 24*   CREATININE 1.0 1.0 1.1*   CALCIUM 9.3 9.5 10.2     No results for input(s): AST, ALT, BILIDIR, BILITOT, ALKPHOS in the last 72 hours. No results for input(s): INR in the last 72 hours. No results for input(s): Reynolds Gey in the last 72 hours. No results for input(s): AST, ALT, ALB, BILIDIR, BILITOT, ALKPHOS in the last 72 hours. No results for input(s): LACTA in the last 72 hours.   Lab Results   Component Value Date    LABURIC 6.1 (H) 04/15/2017     No results found for: [facility]    Assessment:    Active Hospital Problems    Diagnosis Date Noted    Cellulitis [L03.90] 07/29/2022     Priority: Medium   KARAN   II DM  PYODERMA GANGRENOSUM LLE  Plan:  [facility]    Electronically signed by Coni Tierney DO on 8/22/2022 at 4:37 PM Loraine Augustin

## 2022-08-22 NOTE — PROGRESS NOTES
Eddy Monroe who is covering for Dr. Adán Dubose called back and requested a follow up bs in a few hours and see where the patient is at.   Electronically signed by Quitman Lefort, RN on 8/21/2022 at 10:04 PM

## 2022-08-22 NOTE — PLAN OF CARE
Problem: Pain  Goal: Verbalizes/displays adequate comfort level or baseline comfort level  8/22/2022 1122 by Nga Ma RN  Outcome: Progressing     Problem: Safety - Adult  Goal: Free from fall injury  8/22/2022 1122 by Nga Ma RN  Outcome: Progressing     Problem: Chronic Conditions and Co-morbidities  Goal: Patient's chronic conditions and co-morbidity symptoms are monitored and maintained or improved  8/22/2022 1122 by Nga Ma RN  Outcome: Progressing     Problem: ABCDS Injury Assessment  Goal: Absence of physical injury  8/22/2022 1122 by Nga Ma RN  Outcome: Progressing

## 2022-08-22 NOTE — PATIENT CARE CONFERENCE
[facility] Quality Flow/Interdisciplinary Rounds Progress Note        Quality Flow Rounds held on August 22, 2022    Disciplines Attending:  Bedside Nurse, , , and Nursing Unit Leadership    Barak Martinez was admitted on 8/18/2022  2:23 PM    Anticipated Discharge Date:       Disposition:    Christiano Score:  Christiano Scale Score: 21    Readmission Risk              Risk of Unplanned Readmission:  15           Discussed patient goal for the day, patient clinical progression, and barriers to discharge.   The following Goal(s) of the Day/Commitment(s) have been identified:  Diagnostics - Report Results and Labs - Report Results      Alfonso Mcqueen RN  August 22, 2022

## 2022-08-22 NOTE — PROGRESS NOTES
CLINICAL PHARMACY NOTE: MEDS TO BEDS    Total # of Prescriptions Filled: 3   The following medications were delivered to the patient:  Amlodipine 5 mg  Doxycyline 100 mg hyclate  Prednisone 10 mg    Additional Documentation:

## 2022-08-22 NOTE — PLAN OF CARE
Problem: Pain  Goal: Verbalizes/displays adequate comfort level or baseline comfort level  8/22/2022 0131 by Brian Gonzalez RN  Outcome: Progressing     Problem: Discharge Planning  Goal: Discharge to home or other facility with appropriate resources  Outcome: Progressing     Problem: Safety - Adult  Goal: Free from fall injury  8/22/2022 0131 by Brian Gonzalez RN  Outcome: Progressing     Problem: Chronic Conditions and Co-morbidities  Goal: Patient's chronic conditions and co-morbidity symptoms are monitored and maintained or improved  8/22/2022 0131 by Brian Gonzalez RN  Outcome: Progressing     Problem: ABCDS Injury Assessment  Goal: Absence of physical injury  8/22/2022 0131 by Brian Gonzalez RN  Outcome: Progressing

## 2022-08-22 NOTE — PROGRESS NOTES
[address] [place] [address] [facility]  Progress Note    SUBJECTIVE:  Chief Complaint   Patient presents with    Wound Check     Infected tattoo      Patient says the burning & pain is much better today   Tolerating antibiotics  No fevers     Review of systems:  As stated above in the chief complaint, otherwise negative. Medications:  Scheduled Meds:   insulin lispro  0-8 Units SubCUTAneous TID WC    insulin lispro  0-4 Units SubCUTAneous Nightly    methylPREDNISolone  40 mg IntraVENous Q12H    doxycycline hyclate  100 mg Oral 2 times per day    sodium chloride  1,000 mL IntraVENous Once    allopurinol  300 mg Oral QAM    aspirin  81 mg Oral QAM    atorvastatin  40 mg Oral QAM    DULoxetine  60 mg Oral QAM    ergocalciferol  50,000 Units Oral Weekly    ferrous sulfate  325 mg Oral Daily with breakfast    gabapentin  100 mg Oral TID    sodium chloride flush  10 mL IntraVENous 2 times per day    enoxaparin  40 mg SubCUTAneous Daily     Continuous Infusions:   dextrose      sodium chloride       PRN Meds:glucose, dextrose bolus **OR** dextrose bolus, glucagon (rDNA), dextrose, traMADol, sodium chloride flush, sodium chloride, ondansetron **OR** ondansetron, magnesium hydroxide, acetaminophen **OR** acetaminophen    OBJECTIVE:  BP (!) 162/63   Pulse 67   Temp 97.6 °F (36.4 °C) (Oral)   Resp 16   Ht 5' 2\" (1.575 m)   Wt 210 lb (95.3 kg)   SpO2 99%   BMI 38.41 kg/m²   Temp  Av.8 °F (36.6 °C)  Min: 97.6 °F (36.4 °C)  Max: 98 °F (36.7 °C)  Constitutional: The patient is awake, alert, and oriented. In no distress. Skin: Warm and dry. No rashes were noted. HEENT: Round and reactive pupils. Moist mucous membranes. No ulcerations or thrush. Neck: Supple to movements. Chest: No respiratory distress. Symmetrical expansion. No wheezing, crackles or rhonchi. Cardiovascular: S1 and S2 are rhythmic and regular. No murmurs appreciated. Abdomen: Positive bowel sounds to auscultation.  Benign to palpation. No masses felt. Extremities: There is less redness. The pustules are still seeping red/purulent fluid.   Lines: peripheral    8/22/2022:        Laboratory and Tests Review:  Lab Results   Component Value Date    WBC 9.0 08/22/2022    WBC 6.9 08/21/2022    WBC 6.1 08/20/2022    HGB 7.8 (L) 08/22/2022    HCT 25.0 (L) 08/22/2022    MCV 92.6 08/22/2022     08/22/2022     Lab Results   Component Value Date    NEUTROABS 5.02 08/19/2022    NEUTROABS 7.54 (H) 08/18/2022    NEUTROABS 9.69 (H) 08/02/2022     No results found for: [facility]  Lab Results   Component Value Date    ALT 16 07/29/2022    AST 14 07/29/2022    ALKPHOS 93 07/29/2022    BILITOT 0.2 07/29/2022     Lab Results   Component Value Date/Time     08/22/2022 03:13 AM    K 5.0 08/22/2022 03:13 AM    K 4.3 08/19/2022 03:25 AM     08/22/2022 03:13 AM    CO2 21 08/22/2022 03:13 AM    BUN 24 08/22/2022 03:13 AM    CREATININE 1.1 08/22/2022 03:13 AM    CREATININE 1.0 08/21/2022 05:51 AM    CREATININE 1.0 08/20/2022 06:00 AM    GFRAA 60 08/22/2022 03:13 AM    LABGLOM 49 08/22/2022 03:13 AM    GLUCOSE 337 08/22/2022 03:13 AM    PROT 7.7 07/29/2022 12:05 PM    LABALBU 3.5 07/29/2022 12:05 PM    CALCIUM 10.2 08/22/2022 03:13 AM    BILITOT 0.2 07/29/2022 12:05 PM    ALKPHOS 93 07/29/2022 12:05 PM    AST 14 07/29/2022 12:05 PM    ALT 16 07/29/2022 12:05 PM     Lab Results   Component Value Date    CRP 10.2 (H) 08/18/2022    CRP 13.3 (H) 07/30/2022    CRP 12.1 (H) 07/29/2022     Lab Results   Component Value Date    SEDRATE 111 (H) 08/18/2022    SEDRATE 125 (H) 07/30/2022    SEDRATE 124 (H) 07/29/2022     Radiology:      Microbiology:   Blood Cultures 8/18/22: negative so far  Wound Culture 8/18/22: GNR, Corynebacteria     ASSESSMENT:  Recurrent pyoderma gangrenosum left ankle  Possible cellulitis, improving, mostly because of steroids  Low grade fever associated to the above, improved    PLAN:  Continue Doxycycline PO  Check final culture  Continue steroids    ZEINAB Ruiz - CNP  10:51 AM  8/22/2022     Patient seen and examined. I had a face to face encounter with the patient. Agree with exam.  Assessment and plan as outlined above and directed by me. Addition and corrections were done as deemed appropriate. My exam and plan include: The patient is doing better. The lesions on the skin are improving, most likely because of steroids and not because of antibiotics. She can be discharged on steroids and these could be tapered by dermatology. We will give her 7 days of Doxycycline.     Karo Pacheco MD  8/22/2022  12:16 PM

## 2022-08-23 LAB
BLOOD CULTURE, ROUTINE: NORMAL
CULTURE, BLOOD 2: NORMAL

## 2022-08-25 NOTE — DISCHARGE SUMMARY
Hospitalist Discharge Summary    Patient ID: Winston Grey   Patient : 1955  Patient's PCP: Jose Roberto Ward MD    Admit Date: 2022   Admitting Physician: Essence Clarke MD    Discharge Date:  2022   Discharge Physician: Caroline Osborne DO   Discharge Condition: Stable  Discharge Disposition: Home      Discharge Diagnoses: Active Hospital Problems    Diagnosis Date Noted    Cellulitis [L03.90] 2022     Priority: Medium   KARAN   II DM  [address] [place] [address] course in brief:    RASH ON LLE, THOUGHT TO HAVE PYODERMA GANGRENOSUM, ON DOXY AND STEROIDS  TO FOLLOW UP WITH DERM AS AN OP          PHYSICAL EXAM:    BP (!) 162/63   Pulse 67   Temp 97.6 °F (36.4 °C) (Oral)   Resp 16   Ht 5' 2\" (1.575 m)   Wt 210 lb (95.3 kg)   SpO2 99%   BMI 38.41 kg/m²   Gen:WELL DEVELOPED  HEENT: NC/AT, moist mucous membranes, no oropharyngeal erythema or exudate  Neck: supple, trachea midline, no anterior cervical or SC LAD  Heart:  Normal s1/s2, RRR,  Lungs:   bilaterally,  Abd: bowel sounds present, soft, nontender, nondistended, no masses  Extrem:  No clubbing, cyanosis,  POS edema LLE DSD INTACT  Skin: no rashes or lesions  Psych: A & O x3  Neuro: grossly intact, moves all four extremities. Prior to Admission medications    Medication Sig Start Date End Date Taking? Authorizing Provider   doxycycline hyclate (VIBRAMYCIN) 100 MG capsule Take 1 capsule by mouth every 12 hours for 7 days 22 Yes Raymundo Tobar, APRN - CNP   predniSONE (DELTASONE) 10 MG tablet 4 a day x 3 day, 3 a day x 3 days , 2 a day x 3 days , 1 a day x 3 days, the dc 22  Yes Ysabel Roberts DO   amLODIPine (NORVASC) 5 MG tablet Take 1 tablet by mouth daily 22  Yes Caroline Osborne DO   gabapentin (NEURONTIN) 100 MG capsule Take 1 capsule by mouth in the morning and 1 capsule at noon and 1 capsule before bedtime. Do all this for 30 days.  22  Caroline Osborne DO   ergocalciferol (ERGOCALCIFEROL) 1.25 MG (46573 UT) capsule Take 50,000 Units by mouth once a week **FRIDAYS** 3/2/22   Historical Provider, MD   DULoxetine (CYMBALTA) 60 MG extended release capsule Take 60 mg by mouth every morning    Historical Provider, MD   ibuprofen (ADVIL;MOTRIN) 200 MG tablet Take 400-800 mg by mouth every 4 hours as needed 11/10/21 8/2/22  Historical Provider, MD   atorvastatin (LIPITOR) 40 MG tablet Take 40 mg by mouth every morning    Historical Provider, MD   metFORMIN (GLUCOPHAGE) 1000 MG tablet Take 2,000 mg by mouth daily (with breakfast)    Historical Provider, MD   ferrous sulfate 325 (65 FE) MG tablet Take 325 mg by mouth daily (with breakfast)     Historical Provider, MD   aspirin 81 MG chewable tablet Take 81 mg by mouth every morning    Historical Provider, MD   allopurinol (ZYLOPRIM) 300 MG tablet Take 300 mg by mouth every morning    Historical Provider, MD   losartan-hydrochlorothiazide (HYZAAR) 50-12.5 MG per tablet Take 1 tablet by mouth every morning  8/22/22  Historical Provider, MD       Consults:   IP CONSULT TO HOSPITALIST  IP CONSULT TO INFECTIOUS DISEASES  IP CONSULT TO DERMATOLOGY            Discharge Instructions / Follow up:    No future appointments. Continued appropriate risk factor modification of blood pressure, diabetes and serum lipids will remain essential to reducing risk of future atherosclerotic development    Activity: activity as tolerated    Significant labs:  CBC:   No results for input(s): WBC, RBC, HGB, HCT, MCV, RDW, PLT in the last 72 hours. BMP: No results for input(s): NA, K, CL, CO2, BUN, CREATININE, CA, MG, PHOS in the last 72 hours. LFT:  No results for input(s): PROT, ALB, ALKPHOS, ALT, AST, BILITOT, AMYLASE, LIPASE in the last 72 hours. PT/INR: No results for input(s): INR, APTT in the last 72 hours. BNP: No results for input(s): BNP in the last 72 hours.   Hgb A1C:   Lab Results   Component Value Date    LABA1C 9.0 (H) 07/30/2022 Folate and B12:   Lab Results   Component Value Date    YXNAWOIC43 371 08/24/2018   , No results found for: FOLATE  Thyroid Studies:   Lab Results   Component Value Date    TSH 2.710 10/19/2019       Urinalysis:    Lab Results   Component Value Date/Time    NITRU Negative 02/15/2018 10:00 PM    WBCUA 5-10 02/15/2018 10:00 PM    BACTERIA FEW 02/15/2018 10:00 PM    RBCUA >20 02/15/2018 10:00 PM    BLOODU LARGE 02/15/2018 10:00 PM    SPECGRAV 1.010 02/15/2018 10:00 PM    GLUCOSEU Negative 02/15/2018 10:00 PM       Imaging:  XR ANKLE LEFT (MIN 3 VIEWS)    Result Date: 8/18/2022  EXAMINATION: THREE XRAY VIEWS OF THE LEFT ANKLE 8/18/2022 2:03 pm COMPARISON: None. HISTORY: ORDERING SYSTEM PROVIDED HISTORY: leg infection TECHNOLOGIST PROVIDED HISTORY: Reason for exam:->leg infection FINDINGS: No evidence of acute fracture or dislocation. Normal alignment of the ankle mortise. No focal osseous lesion. No evidence of joint effusion. There is mild diffuse soft tissue swelling. Large calcaneal spur and enthesophyte noted. No acute abnormality of the ankle. Diffuse soft tissue swelling. XR ANKLE LEFT (MIN 3 VIEWS)    Result Date: 7/29/2022  EXAMINATION: THREE XRAY VIEWS OF THE LEFT ANKLE 7/29/2022 11:00 am COMPARISON: 14 March 2019 HISTORY: ORDERING SYSTEM PROVIDED HISTORY: left ankle wound TECHNOLOGIST PROVIDED HISTORY: Reason for exam:->left ankle wound FINDINGS: There is a soft tissue wound laterally just above the ankle. There is generalized soft tissue swelling which is somewhat greater laterally than medially. No osteomyelitis, radiopaque foreign body or soft tissue emphysema. Plantar and Achilles heel spurs are as before. Soft tissue wound. No osteomyelitis. Heel spurs. Soft tissue swelling.        Discharge Medications:      Medication List        START taking these medications      amLODIPine 5 MG tablet  Commonly known as: NORVASC  Take 1 tablet by mouth daily     doxycycline hyclate 100 MG

## 2022-09-07 NOTE — PROGRESS NOTES
Physician Progress Note      PATIENT:               Angela Powell  CSN #:                  [id number]  :                       1955  ADMIT DATE:       2022 2:23 PM  [facility] DATE:        2022 4:20 PM  RESPONDING  PROVIDER #:        Flip Arechiga DO          QUERY TEXT:    Dear Attending Physician,    Pt admitted with left ankle cellulitis. Pt noted to have DM . If possible,   please document in progress notes and discharge summary the relationship, if   any, between cellulitis and DM. The medical record reflects the following:  Risk Factors:  tattoo to left ankle 10 months ago, admitted end of July for   cellulitis of left ankle, DM  Clinical Indicators: Per H/P,\". Love Robles Principal Problem: Cellulitis/open wound at   site of previous tattoo status post recent treatment with antibiotics and   short burst of steroids . Love Robles Diabetes Mellitus. ..pyoderma gangrenosum . Love Margaret Robles \"  Per   ID  ,\". Love Margaret Love Margaret Left ankle skin lesion, possible pyoderma grangrenosum, patient's   symptoms improved while on prednisone, she was on a 12 day taper and pain and   burning returned after stopping steroids Possible cellulitis . Love Robles ..comes back   to the hospital after she completed a tapering course of steroids. She   improved clinically while she was on steroids. Treatment: IV ATB, steroids    Thank you,  Joan Juarez RN CCDS  Clinical Documentation Improvement Specialist  Options provided:  -- Left ankle cellulitis associated with Diabetes  -- Left ankle cellulitis unrelated to Diabetes  -- Other - I will add my own diagnosis  -- Disagree - Not applicable / Not valid  -- Disagree - Clinically unable to determine / Unknown  -- Refer to Clinical Documentation Reviewer    PROVIDER RESPONSE TEXT:    Left ankle cellulitis associated with Diabetes.     Query created by: Ketty Santamaria on 2022 8:17 AM      Electronically signed by:  Flip Arechiga DO 2022 1:54 PM

## 2023-02-02 ENCOUNTER — HOSPITAL ENCOUNTER (OUTPATIENT)
Age: 68
Discharge: HOME | End: 2023-02-02
Payer: MEDICARE

## 2023-02-02 LAB
BASOPHILS ABSOLUTE: 0.04 E9/L (ref 0–0.2)
BASOPHILS RELATIVE PERCENT: 0.4 % (ref 0–2)
EOSINOPHILS ABSOLUTE: 0.14 E9/L (ref 0.05–0.5)
EOSINOPHILS RELATIVE PERCENT: 1.5 % (ref 0–6)
HCT VFR BLD CALC: 32.7 % (ref 34–48)
HEMOGLOBIN: 10.5 G/DL (ref 11.5–15.5)
IMMATURE GRANULOCYTES #: 0.04 E9/L
IMMATURE GRANULOCYTES %: 0.4 % (ref 0–5)
LYMPHOCYTES ABSOLUTE: 1.63 E9/L (ref 1.5–4)
LYMPHOCYTES RELATIVE PERCENT: 17.9 % (ref 20–42)
MCH RBC QN AUTO: 28.9 PG (ref 26–35)
MCHC RBC AUTO-ENTMCNC: 32.1 % (ref 32–34.5)
MCV RBC AUTO: 90.1 FL (ref 80–99.9)
MONOCYTES ABSOLUTE: 0.37 E9/L (ref 0.1–0.95)
MONOCYTES RELATIVE PERCENT: 4.1 % (ref 2–12)
NEUTROPHILS ABSOLUTE: 6.89 E9/L (ref 1.8–7.3)
NEUTROPHILS RELATIVE PERCENT: 75.7 % (ref 43–80)
PDW BLD-RTO: 13.6 FL (ref 11.5–15)
PLATELET # BLD: 320 E9/L (ref 130–450)
PMV BLD AUTO: 10.1 FL (ref 7–12)
RBC # BLD: 3.63 E12/L (ref 3.5–5.5)
SEDIMENTATION RATE, ERYTHROCYTE: 55 MM/HR (ref 0–20)
WBC # BLD: 9.1 E9/L (ref 4.5–11.5)

## 2023-02-02 PROCEDURE — 86038 ANTINUCLEAR ANTIBODIES: CPT

## 2023-02-02 PROCEDURE — 85025 COMPLETE CBC W/AUTO DIFF WBC: CPT

## 2023-02-02 PROCEDURE — 86039 ANTINUCLEAR ANTIBODIES (ANA): CPT

## 2023-02-02 PROCEDURE — 85651 RBC SED RATE NONAUTOMATED: CPT

## 2023-02-02 PROCEDURE — 86147 CARDIOLIPIN ANTIBODY EA IG: CPT

## 2023-02-02 PROCEDURE — 36415 COLL VENOUS BLD VENIPUNCTURE: CPT

## 2023-02-03 LAB
ANA PATTERN: ABNORMAL
ANA TITER: ABNORMAL
ANTI-NUCLEAR ANTIBODY (ANA): POSITIVE

## 2023-02-05 LAB
ANTICARDIOLIPIN IGA ANTIBODY: <10 APL
ANTICARDIOLIPIN IGG ANTIBODY: 12 GPL
CARDIOLIPIN AB IGM: 18 MPL

## 2023-05-26 ENCOUNTER — HOSPITAL ENCOUNTER (OUTPATIENT)
Age: 68
Discharge: HOME | End: 2023-05-26
Payer: MEDICARE

## 2023-05-26 LAB
ALBUMIN SERPL-MCNC: 4.2 G/DL (ref 3.5–5.2)
ALP SERPL-CCNC: 86 U/L (ref 35–104)
ALT SERPL-CCNC: 24 U/L (ref 0–32)
ANION GAP SERPL CALCULATED.3IONS-SCNC: 10 MMOL/L (ref 7–16)
AST SERPL-CCNC: 26 U/L (ref 0–31)
BASOPHILS # BLD: 0.03 E9/L (ref 0–0.2)
BASOPHILS NFR BLD: 0.5 % (ref 0–2)
BILIRUB SERPL-MCNC: 0.6 MG/DL (ref 0–1.2)
BUN SERPL-MCNC: 24 MG/DL (ref 6–23)
CALCIUM SERPL-MCNC: 11.5 MG/DL (ref 8.6–10.2)
CHLORIDE SERPL-SCNC: 104 MMOL/L (ref 98–107)
CO2 SERPL-SCNC: 24 MMOL/L (ref 22–29)
CREAT SERPL-MCNC: 1.4 MG/DL (ref 0.5–1)
CRP SERPL HS-MCNC: 0.5 MG/DL (ref 0–0.4)
EOSINOPHIL # BLD: 0.2 E9/L (ref 0.05–0.5)
EOSINOPHIL NFR BLD: 3.5 % (ref 0–6)
ERYTHROCYTE [DISTWIDTH] IN BLOOD BY AUTOMATED COUNT: 13.7 FL (ref 11.5–15)
GLUCOSE SERPL-MCNC: 165 MG/DL (ref 74–99)
HCT VFR BLD AUTO: 31.9 % (ref 34–48)
HGB BLD-MCNC: 9.9 G/DL (ref 11.5–15.5)
IMM GRANULOCYTES # BLD: 0.02 E9/L
IMM GRANULOCYTES NFR BLD: 0.4 % (ref 0–5)
LYMPHOCYTES # BLD: 1.6 E9/L (ref 1.5–4)
LYMPHOCYTES NFR BLD: 28.1 % (ref 20–42)
MCH RBC QN AUTO: 29.5 PG (ref 26–35)
MCHC RBC AUTO-ENTMCNC: 31 % (ref 32–34.5)
MCV RBC AUTO: 94.9 FL (ref 80–99.9)
MONOCYTES # BLD: 0.31 E9/L (ref 0.1–0.95)
MONOCYTES NFR BLD: 5.4 % (ref 2–12)
NEUTROPHILS # BLD: 3.53 E9/L (ref 1.8–7.3)
NEUTS SEG NFR BLD: 62.1 % (ref 43–80)
PLATELET # BLD AUTO: 320 E9/L (ref 130–450)
PMV BLD AUTO: 10.8 FL (ref 7–12)
POTASSIUM SERPL-SCNC: 3.9 MMOL/L (ref 3.5–5)
PROT SERPL-MCNC: 7.8 G/DL (ref 6.4–8.3)
RBC # BLD AUTO: 3.36 E12/L (ref 3.5–5.5)
SODIUM SERPL-SCNC: 138 MMOL/L (ref 132–146)
WBC # BLD: 5.7 E9/L (ref 4.5–11.5)

## 2023-05-26 PROCEDURE — 80053 COMPREHEN METABOLIC PANEL: CPT

## 2023-05-26 PROCEDURE — 86140 C-REACTIVE PROTEIN: CPT

## 2023-05-26 PROCEDURE — 85025 COMPLETE CBC W/AUTO DIFF WBC: CPT

## 2023-05-26 PROCEDURE — 36415 COLL VENOUS BLD VENIPUNCTURE: CPT

## 2023-08-20 ENCOUNTER — HOSPITAL ENCOUNTER (EMERGENCY)
Age: 68
Discharge: HOME | End: 2023-08-20
Payer: MEDICARE

## 2023-08-20 VITALS
TEMPERATURE: 98 F | WEIGHT: 192 LBS | RESPIRATION RATE: 18 BRPM | OXYGEN SATURATION: 98 % | HEIGHT: 62 IN | BODY MASS INDEX: 35.33 KG/M2 | HEART RATE: 89 BPM | DIASTOLIC BLOOD PRESSURE: 83 MMHG | SYSTOLIC BLOOD PRESSURE: 138 MMHG

## 2023-08-20 DIAGNOSIS — S61.212A LACERATION OF RIGHT MIDDLE FINGER WITHOUT FOREIGN BODY WITHOUT DAMAGE TO NAIL, INITIAL ENCOUNTER: Primary | ICD-10-CM

## 2023-08-20 PROCEDURE — 12001 RPR S/N/AX/GEN/TRNK 2.5CM/<: CPT

## 2023-08-20 PROCEDURE — 99284 EMERGENCY DEPT VISIT MOD MDM: CPT

## 2023-08-20 PROCEDURE — 90714 TD VACC NO PRESV 7 YRS+ IM: CPT | Performed by: NURSE PRACTITIONER

## 2023-08-20 PROCEDURE — 90471 IMMUNIZATION ADMIN: CPT | Performed by: NURSE PRACTITIONER

## 2023-08-20 PROCEDURE — 6360000002 HC RX W HCPCS: Performed by: NURSE PRACTITIONER

## 2023-08-20 RX ORDER — GLIPIZIDE 5 MG/1
5 TABLET ORAL
COMMUNITY

## 2023-08-20 RX ORDER — AZITHROMYCIN 250 MG/1
250 TABLET, FILM COATED ORAL DAILY
COMMUNITY

## 2023-08-20 RX ADMIN — CLOSTRIDIUM TETANI TOXOID ANTIGEN (FORMALDEHYDE INACTIVATED) AND CORYNEBACTERIUM DIPHTHERIAE TOXOID ANTIGEN (FORMALDEHYDE INACTIVATED) 0.5 ML: 5; 2 INJECTION, SUSPENSION INTRAMUSCULAR at 20:39

## 2023-08-20 ASSESSMENT — PAIN - FUNCTIONAL ASSESSMENT: PAIN_FUNCTIONAL_ASSESSMENT: NONE - DENIES PAIN

## 2023-08-21 NOTE — DISCHARGE INSTRUCTIONS
Keep Wound clean and dry do not get wet for 24 hours then may wash with soap and water and apply a Band-Aid and finger splint.

## 2023-08-21 NOTE — ED NOTES
Cleaned wound, and finger splinted left , middle finger. Pt.  Tolerated well     Dexter Washington  08/20/23 2048

## 2023-08-22 NOTE — ED PROVIDER NOTES
[address] ENCOUNTER        Pt Name: Dewayne Quinones  MRN: [id number]  [facility] 1955  Date of evaluation: 8/20/2023  Provider: ZEINAB Troncoso - AVRIL  PCP: Shayy Griffin MD  Note Started: 1:12 AM EDT 8/22/23      DEVON. I have evaluated this patient. CHIEF COMPLAINT       Chief Complaint   Patient presents with    Laceration     Right middle finger laceration on tin lid at 2pm; need tetanus        HISTORY OF PRESENT ILLNESS: 1 or more Elements     History from : Patient    Limitations to history : None    Dewayne Quinones is a 76 y.o. female who presents emergency department for finger laceration. Patient states that she cut her right third finger on a tin can several hours prior to arrival she states that she continues to have some bleeding. She states that she does take  Aspirin daily. She denies any numbness tingling or weakness she states that she has full range of motion to her finger. Patient states that she did apply several Band-Aids to the area but that the area is still slightly oozy. Patient states that she is not  up-to-date on her tetanus vaccine. Nursing Notes were all reviewed and agreed with or any disagreements were addressed in the HPI. REVIEW OF SYSTEMS :      Review of Systems   All other systems reviewed and are negative. Positives and Pertinent negatives as per HPI.      SURGICAL HISTORY     Past Surgical History:   Procedure Laterality Date    CARPAL TUNNEL RELEASE      CHOLECYSTECTOMY      COLECTOMY      COLONOSCOPY      KNEE ARTHROSCOPY      [facility] EXC RCT DANA PROCTOTOMY TRANSSAC/TRANSCOCCYGEAL N/A 2/13/2019    EXAMINE UNDER ANESTHESIA WITH RECTAL INCISION AND DRAINAGE performed by Yamil Gerber MD at [facility]       Discharge Medication List as of 8/20/2023  8:47 PM        CONTINUE these medications

## 2023-10-23 ENCOUNTER — HOSPITAL ENCOUNTER (OUTPATIENT)
Dept: GENERAL RADIOLOGY | Age: 68
Discharge: HOME | End: 2023-10-25
Payer: MEDICARE

## 2023-10-23 ENCOUNTER — HOSPITAL ENCOUNTER (OUTPATIENT)
Age: 68
Discharge: HOME | End: 2023-10-25
Payer: MEDICARE

## 2023-10-23 DIAGNOSIS — M25.562 PAIN IN BOTH KNEES, UNSPECIFIED CHRONICITY: ICD-10-CM

## 2023-10-23 DIAGNOSIS — M25.561 PAIN IN BOTH KNEES, UNSPECIFIED CHRONICITY: ICD-10-CM

## 2023-10-23 PROCEDURE — 73564 X-RAY EXAM KNEE 4 OR MORE: CPT

## 2025-01-31 DIAGNOSIS — K50.113 CROHN'S DISEASE OF LARGE INTESTINE WITH FISTULA (HCC): Primary | ICD-10-CM

## 2025-01-31 RX ORDER — ACETAMINOPHEN 325 MG/1
650 TABLET ORAL
OUTPATIENT
Start: 2025-01-31

## 2025-01-31 RX ORDER — DIPHENHYDRAMINE HYDROCHLORIDE 50 MG/ML
50 INJECTION INTRAMUSCULAR; INTRAVENOUS
OUTPATIENT
Start: 2025-01-31

## 2025-01-31 RX ORDER — ACETAMINOPHEN 325 MG/1
650 TABLET ORAL ONCE
OUTPATIENT
Start: 2025-01-31

## 2025-01-31 RX ORDER — SODIUM CHLORIDE 9 MG/ML
INJECTION, SOLUTION INTRAVENOUS CONTINUOUS
OUTPATIENT
Start: 2025-01-31

## 2025-01-31 RX ORDER — SODIUM CHLORIDE 9 MG/ML
5-250 INJECTION, SOLUTION INTRAVENOUS PRN
OUTPATIENT
Start: 2025-01-31

## 2025-01-31 RX ORDER — EPINEPHRINE 1 MG/ML
0.3 INJECTION, SOLUTION, CONCENTRATE INTRAVENOUS PRN
OUTPATIENT
Start: 2025-01-31

## 2025-01-31 RX ORDER — SODIUM CHLORIDE 0.9 % (FLUSH) 0.9 %
5-40 SYRINGE (ML) INJECTION PRN
OUTPATIENT
Start: 2025-01-31

## 2025-01-31 RX ORDER — HYDROCORTISONE SODIUM SUCCINATE 100 MG/2ML
100 INJECTION INTRAMUSCULAR; INTRAVENOUS
OUTPATIENT
Start: 2025-01-31

## 2025-01-31 RX ORDER — ONDANSETRON 2 MG/ML
8 INJECTION INTRAMUSCULAR; INTRAVENOUS
OUTPATIENT
Start: 2025-01-31

## 2025-01-31 RX ORDER — ALBUTEROL SULFATE 90 UG/1
4 INHALANT RESPIRATORY (INHALATION) PRN
OUTPATIENT
Start: 2025-01-31

## 2025-02-27 ENCOUNTER — HOSPITAL ENCOUNTER (OUTPATIENT)
Dept: INFUSION THERAPY | Age: 70
Setting detail: INFUSION SERIES
Discharge: HOME | End: 2025-02-27
Payer: MEDICARE

## 2025-02-27 VITALS
TEMPERATURE: 97.6 F | DIASTOLIC BLOOD PRESSURE: 58 MMHG | SYSTOLIC BLOOD PRESSURE: 121 MMHG | RESPIRATION RATE: 16 BRPM | HEART RATE: 72 BPM | WEIGHT: 210.4 LBS | OXYGEN SATURATION: 99 % | BODY MASS INDEX: 38.48 KG/M2

## 2025-02-27 DIAGNOSIS — K50.113 CROHN'S DISEASE OF LARGE INTESTINE WITH FISTULA (HCC): Primary | ICD-10-CM

## 2025-02-27 PROCEDURE — 2580000003 HC RX 258: Performed by: INTERNAL MEDICINE

## 2025-02-27 PROCEDURE — 96415 CHEMO IV INFUSION ADDL HR: CPT

## 2025-02-27 PROCEDURE — 96413 CHEMO IV INFUSION 1 HR: CPT

## 2025-02-27 PROCEDURE — 2500000003 HC RX 250 WO HCPCS: Performed by: INTERNAL MEDICINE

## 2025-02-27 PROCEDURE — 6360000002 HC RX W HCPCS: Performed by: INTERNAL MEDICINE

## 2025-02-27 RX ORDER — DIPHENHYDRAMINE HYDROCHLORIDE 50 MG/ML
50 INJECTION INTRAMUSCULAR; INTRAVENOUS
OUTPATIENT
Start: 2025-04-10

## 2025-02-27 RX ORDER — EPINEPHRINE 1 MG/ML
0.3 INJECTION, SOLUTION, CONCENTRATE INTRAVENOUS PRN
OUTPATIENT
Start: 2025-04-10

## 2025-02-27 RX ORDER — CHOLECALCIFEROL (VITAMIN D3) 1250 MCG
1 CAPSULE ORAL WEEKLY
COMMUNITY

## 2025-02-27 RX ORDER — SODIUM CHLORIDE 0.9 % (FLUSH) 0.9 %
5-40 SYRINGE (ML) INJECTION PRN
Status: DISCONTINUED | OUTPATIENT
Start: 2025-02-27 | End: 2025-02-28 | Stop reason: HOSPADM

## 2025-02-27 RX ORDER — SODIUM CHLORIDE 9 MG/ML
5-250 INJECTION, SOLUTION INTRAVENOUS PRN
OUTPATIENT
Start: 2025-04-10

## 2025-02-27 RX ORDER — SODIUM CHLORIDE 9 MG/ML
INJECTION, SOLUTION INTRAVENOUS CONTINUOUS
OUTPATIENT
Start: 2025-04-10

## 2025-02-27 RX ORDER — ALBUTEROL SULFATE 90 UG/1
4 INHALANT RESPIRATORY (INHALATION) PRN
OUTPATIENT
Start: 2025-04-10

## 2025-02-27 RX ORDER — ONDANSETRON 2 MG/ML
8 INJECTION INTRAMUSCULAR; INTRAVENOUS
OUTPATIENT
Start: 2025-04-10

## 2025-02-27 RX ORDER — ACETAMINOPHEN 325 MG/1
650 TABLET ORAL
OUTPATIENT
Start: 2025-04-10

## 2025-02-27 RX ORDER — ACETAMINOPHEN 325 MG/1
650 TABLET ORAL ONCE
OUTPATIENT
Start: 2025-04-10

## 2025-02-27 RX ORDER — SODIUM CHLORIDE 9 MG/ML
5-250 INJECTION, SOLUTION INTRAVENOUS PRN
Status: DISCONTINUED | OUTPATIENT
Start: 2025-02-27 | End: 2025-02-28 | Stop reason: HOSPADM

## 2025-02-27 RX ORDER — ACETAMINOPHEN 325 MG/1
650 TABLET ORAL ONCE
Status: DISCONTINUED | OUTPATIENT
Start: 2025-02-27 | End: 2025-02-28 | Stop reason: HOSPADM

## 2025-02-27 RX ORDER — SODIUM CHLORIDE 0.9 % (FLUSH) 0.9 %
5-40 SYRINGE (ML) INJECTION PRN
OUTPATIENT
Start: 2025-04-10

## 2025-02-27 RX ORDER — HYDROCORTISONE SODIUM SUCCINATE 100 MG/2ML
100 INJECTION INTRAMUSCULAR; INTRAVENOUS
OUTPATIENT
Start: 2025-04-10

## 2025-02-27 RX ADMIN — SODIUM CHLORIDE, PRESERVATIVE FREE 10 ML: 5 INJECTION INTRAVENOUS at 15:24

## 2025-02-27 RX ADMIN — SODIUM CHLORIDE, PRESERVATIVE FREE 10 ML: 5 INJECTION INTRAVENOUS at 15:23

## 2025-02-27 RX ADMIN — SODIUM CHLORIDE, PRESERVATIVE FREE 10 ML: 5 INJECTION INTRAVENOUS at 12:52

## 2025-02-27 RX ADMIN — INFLIXIMAB 900 MG: 100 INJECTION, POWDER, LYOPHILIZED, FOR SOLUTION INTRAVENOUS at 13:14

## 2025-02-27 NOTE — PROGRESS NOTES
Before infusion patient declined any infections, open wounds, or change in medical condition. Tolerated infusion well.  Reviewed therapy plan, offered education material and/or discharge material, reviewed medication information and signs and symptoms  and educated on possible side effects, verbalizes good knowledge of current plan patient verbalizes understanding, and has no signs or symptoms to report at this time. Patient discharged. Patient alert and oriented x3.   No distress noted.   Vital signs stable.   Patient denies any new or worsening pain.  Patient denies any needs.  All questions answered.  Next appointment scheduled. declines copy of AVS. Instructed on importance, patient declines 30 min observation after infusion completed.

## 2025-04-10 ENCOUNTER — HOSPITAL ENCOUNTER (OUTPATIENT)
Dept: INFUSION THERAPY | Age: 70
Setting detail: INFUSION SERIES
Discharge: HOME | End: 2025-04-10
Payer: MEDICARE

## 2025-04-10 VITALS
OXYGEN SATURATION: 98 % | HEART RATE: 85 BPM | SYSTOLIC BLOOD PRESSURE: 117 MMHG | TEMPERATURE: 96.4 F | RESPIRATION RATE: 16 BRPM | DIASTOLIC BLOOD PRESSURE: 55 MMHG

## 2025-04-10 DIAGNOSIS — K50.113 CROHN'S DISEASE OF LARGE INTESTINE WITH FISTULA (HCC): Primary | ICD-10-CM

## 2025-04-10 PROCEDURE — 6360000002 HC RX W HCPCS: Performed by: INTERNAL MEDICINE

## 2025-04-10 PROCEDURE — 96413 CHEMO IV INFUSION 1 HR: CPT

## 2025-04-10 PROCEDURE — 2500000003 HC RX 250 WO HCPCS: Performed by: INTERNAL MEDICINE

## 2025-04-10 PROCEDURE — 2580000003 HC RX 258: Performed by: INTERNAL MEDICINE

## 2025-04-10 RX ORDER — ACETAMINOPHEN 325 MG/1
650 TABLET ORAL ONCE
OUTPATIENT
Start: 2025-05-22

## 2025-04-10 RX ORDER — SODIUM CHLORIDE 0.9 % (FLUSH) 0.9 %
5-40 SYRINGE (ML) INJECTION PRN
OUTPATIENT
Start: 2025-05-22

## 2025-04-10 RX ORDER — SODIUM CHLORIDE 0.9 % (FLUSH) 0.9 %
5-40 SYRINGE (ML) INJECTION PRN
Status: DISCONTINUED | OUTPATIENT
Start: 2025-04-10 | End: 2025-04-11 | Stop reason: HOSPADM

## 2025-04-10 RX ORDER — DIPHENHYDRAMINE HYDROCHLORIDE 50 MG/ML
50 INJECTION, SOLUTION INTRAMUSCULAR; INTRAVENOUS
OUTPATIENT
Start: 2025-05-22

## 2025-04-10 RX ORDER — HYDROCORTISONE SODIUM SUCCINATE 100 MG/2ML
100 INJECTION INTRAMUSCULAR; INTRAVENOUS
OUTPATIENT
Start: 2025-05-22

## 2025-04-10 RX ORDER — SODIUM CHLORIDE 9 MG/ML
INJECTION, SOLUTION INTRAVENOUS CONTINUOUS
OUTPATIENT
Start: 2025-05-22

## 2025-04-10 RX ORDER — EPINEPHRINE 1 MG/ML
0.3 INJECTION, SOLUTION, CONCENTRATE INTRAVENOUS PRN
OUTPATIENT
Start: 2025-05-22

## 2025-04-10 RX ORDER — SODIUM CHLORIDE 9 MG/ML
5-250 INJECTION, SOLUTION INTRAVENOUS PRN
OUTPATIENT
Start: 2025-05-22

## 2025-04-10 RX ORDER — ACETAMINOPHEN 325 MG/1
650 TABLET ORAL ONCE
Status: DISCONTINUED | OUTPATIENT
Start: 2025-04-10 | End: 2025-04-11 | Stop reason: HOSPADM

## 2025-04-10 RX ORDER — ALBUTEROL SULFATE 90 UG/1
4 INHALANT RESPIRATORY (INHALATION) PRN
OUTPATIENT
Start: 2025-05-22

## 2025-04-10 RX ORDER — ONDANSETRON 2 MG/ML
8 INJECTION INTRAMUSCULAR; INTRAVENOUS
OUTPATIENT
Start: 2025-05-22

## 2025-04-10 RX ORDER — SODIUM CHLORIDE 9 MG/ML
5-250 INJECTION, SOLUTION INTRAVENOUS PRN
Status: DISCONTINUED | OUTPATIENT
Start: 2025-04-10 | End: 2025-04-11 | Stop reason: HOSPADM

## 2025-04-10 RX ORDER — ACETAMINOPHEN 325 MG/1
650 TABLET ORAL
OUTPATIENT
Start: 2025-05-22

## 2025-04-10 RX ADMIN — SODIUM CHLORIDE, PRESERVATIVE FREE 10 ML: 5 INJECTION INTRAVENOUS at 13:05

## 2025-04-10 RX ADMIN — INFLIXIMAB 900 MG: 100 INJECTION, POWDER, LYOPHILIZED, FOR SOLUTION INTRAVENOUS at 13:11

## 2025-04-10 RX ADMIN — SODIUM CHLORIDE, PRESERVATIVE FREE 10 ML: 5 INJECTION INTRAVENOUS at 14:21

## 2025-05-22 ENCOUNTER — HOSPITAL ENCOUNTER (OUTPATIENT)
Dept: INFUSION THERAPY | Age: 70
Setting detail: INFUSION SERIES
Discharge: HOME | End: 2025-05-22
Payer: MEDICARE

## 2025-05-22 VITALS
OXYGEN SATURATION: 97 % | HEIGHT: 62 IN | WEIGHT: 210 LBS | HEART RATE: 88 BPM | DIASTOLIC BLOOD PRESSURE: 69 MMHG | TEMPERATURE: 98 F | RESPIRATION RATE: 16 BRPM | BODY MASS INDEX: 38.64 KG/M2 | SYSTOLIC BLOOD PRESSURE: 118 MMHG

## 2025-05-22 DIAGNOSIS — K50.113 CROHN'S DISEASE OF LARGE INTESTINE WITH FISTULA (HCC): Primary | ICD-10-CM

## 2025-05-22 PROCEDURE — 2580000003 HC RX 258: Performed by: INTERNAL MEDICINE

## 2025-05-22 PROCEDURE — 2500000003 HC RX 250 WO HCPCS: Performed by: INTERNAL MEDICINE

## 2025-05-22 PROCEDURE — 96413 CHEMO IV INFUSION 1 HR: CPT

## 2025-05-22 PROCEDURE — 6360000002 HC RX W HCPCS: Performed by: INTERNAL MEDICINE

## 2025-05-22 RX ORDER — EPINEPHRINE 1 MG/ML
0.3 INJECTION, SOLUTION, CONCENTRATE INTRAVENOUS PRN
OUTPATIENT
Start: 2025-07-03

## 2025-05-22 RX ORDER — SODIUM CHLORIDE 9 MG/ML
5-250 INJECTION, SOLUTION INTRAVENOUS PRN
OUTPATIENT
Start: 2025-07-03

## 2025-05-22 RX ORDER — SODIUM CHLORIDE 9 MG/ML
INJECTION, SOLUTION INTRAVENOUS CONTINUOUS
OUTPATIENT
Start: 2025-07-03

## 2025-05-22 RX ORDER — SODIUM CHLORIDE 0.9 % (FLUSH) 0.9 %
5-40 SYRINGE (ML) INJECTION PRN
OUTPATIENT
Start: 2025-07-03

## 2025-05-22 RX ORDER — SODIUM CHLORIDE 9 MG/ML
5-250 INJECTION, SOLUTION INTRAVENOUS PRN
Status: DISCONTINUED | OUTPATIENT
Start: 2025-05-22 | End: 2025-05-23 | Stop reason: HOSPADM

## 2025-05-22 RX ORDER — ACETAMINOPHEN 325 MG/1
650 TABLET ORAL ONCE
OUTPATIENT
Start: 2025-07-03

## 2025-05-22 RX ORDER — ACETAMINOPHEN 325 MG/1
650 TABLET ORAL ONCE
Status: DISCONTINUED | OUTPATIENT
Start: 2025-05-22 | End: 2025-05-23 | Stop reason: HOSPADM

## 2025-05-22 RX ORDER — HYDROCORTISONE SODIUM SUCCINATE 100 MG/2ML
100 INJECTION INTRAMUSCULAR; INTRAVENOUS
OUTPATIENT
Start: 2025-07-03

## 2025-05-22 RX ORDER — DIPHENHYDRAMINE HYDROCHLORIDE 50 MG/ML
50 INJECTION, SOLUTION INTRAMUSCULAR; INTRAVENOUS
OUTPATIENT
Start: 2025-07-03

## 2025-05-22 RX ORDER — ALBUTEROL SULFATE 90 UG/1
4 INHALANT RESPIRATORY (INHALATION) PRN
OUTPATIENT
Start: 2025-07-03

## 2025-05-22 RX ORDER — ONDANSETRON 2 MG/ML
8 INJECTION INTRAMUSCULAR; INTRAVENOUS
OUTPATIENT
Start: 2025-07-03

## 2025-05-22 RX ORDER — SODIUM CHLORIDE 0.9 % (FLUSH) 0.9 %
5-40 SYRINGE (ML) INJECTION PRN
Status: DISCONTINUED | OUTPATIENT
Start: 2025-05-22 | End: 2025-05-23 | Stop reason: HOSPADM

## 2025-05-22 RX ORDER — ACETAMINOPHEN 325 MG/1
650 TABLET ORAL
OUTPATIENT
Start: 2025-07-03

## 2025-05-22 RX ADMIN — INFLIXIMAB 900 MG: 100 INJECTION, POWDER, LYOPHILIZED, FOR SOLUTION INTRAVENOUS at 14:10

## 2025-05-22 RX ADMIN — SODIUM CHLORIDE, PRESERVATIVE FREE 20 ML: 5 INJECTION INTRAVENOUS at 15:15

## 2025-05-22 RX ADMIN — SODIUM CHLORIDE, PRESERVATIVE FREE 10 ML: 5 INJECTION INTRAVENOUS at 14:09

## 2025-07-03 ENCOUNTER — HOSPITAL ENCOUNTER (OUTPATIENT)
Dept: INFUSION THERAPY | Age: 70
Setting detail: INFUSION SERIES
Discharge: HOME | End: 2025-07-03
Payer: MEDICARE

## 2025-07-03 VITALS
OXYGEN SATURATION: 98 % | RESPIRATION RATE: 16 BRPM | DIASTOLIC BLOOD PRESSURE: 62 MMHG | WEIGHT: 205 LBS | TEMPERATURE: 96.8 F | SYSTOLIC BLOOD PRESSURE: 116 MMHG | HEART RATE: 93 BPM | BODY MASS INDEX: 37.49 KG/M2

## 2025-07-03 DIAGNOSIS — K50.113 CROHN'S DISEASE OF LARGE INTESTINE WITH FISTULA (HCC): Primary | ICD-10-CM

## 2025-07-03 PROCEDURE — 96413 CHEMO IV INFUSION 1 HR: CPT

## 2025-07-03 PROCEDURE — 2500000003 HC RX 250 WO HCPCS: Performed by: INTERNAL MEDICINE

## 2025-07-03 PROCEDURE — 2580000003 HC RX 258: Performed by: INTERNAL MEDICINE

## 2025-07-03 PROCEDURE — 6360000002 HC RX W HCPCS: Performed by: INTERNAL MEDICINE

## 2025-07-03 RX ORDER — SODIUM CHLORIDE 9 MG/ML
5-250 INJECTION, SOLUTION INTRAVENOUS PRN
Status: DISCONTINUED | OUTPATIENT
Start: 2025-07-03 | End: 2025-07-04 | Stop reason: HOSPADM

## 2025-07-03 RX ORDER — HYDROCORTISONE SODIUM SUCCINATE 100 MG/2ML
100 INJECTION INTRAMUSCULAR; INTRAVENOUS
OUTPATIENT
Start: 2025-08-14

## 2025-07-03 RX ORDER — ACETAMINOPHEN 325 MG/1
650 TABLET ORAL ONCE
Status: DISCONTINUED | OUTPATIENT
Start: 2025-07-03 | End: 2025-07-04 | Stop reason: HOSPADM

## 2025-07-03 RX ORDER — ACETAMINOPHEN 325 MG/1
650 TABLET ORAL
OUTPATIENT
Start: 2025-08-14

## 2025-07-03 RX ORDER — SODIUM CHLORIDE 9 MG/ML
5-250 INJECTION, SOLUTION INTRAVENOUS PRN
OUTPATIENT
Start: 2025-08-14

## 2025-07-03 RX ORDER — ONDANSETRON 2 MG/ML
8 INJECTION INTRAMUSCULAR; INTRAVENOUS
OUTPATIENT
Start: 2025-08-14

## 2025-07-03 RX ORDER — ALBUTEROL SULFATE 90 UG/1
4 INHALANT RESPIRATORY (INHALATION) PRN
OUTPATIENT
Start: 2025-08-14

## 2025-07-03 RX ORDER — SODIUM CHLORIDE 0.9 % (FLUSH) 0.9 %
5-40 SYRINGE (ML) INJECTION PRN
Status: DISCONTINUED | OUTPATIENT
Start: 2025-07-03 | End: 2025-07-04 | Stop reason: HOSPADM

## 2025-07-03 RX ORDER — EPINEPHRINE 1 MG/ML
0.3 INJECTION, SOLUTION, CONCENTRATE INTRAVENOUS PRN
OUTPATIENT
Start: 2025-08-14

## 2025-07-03 RX ORDER — SODIUM CHLORIDE 9 MG/ML
INJECTION, SOLUTION INTRAVENOUS CONTINUOUS
OUTPATIENT
Start: 2025-08-14

## 2025-07-03 RX ORDER — DIPHENHYDRAMINE HYDROCHLORIDE 50 MG/ML
50 INJECTION, SOLUTION INTRAMUSCULAR; INTRAVENOUS
OUTPATIENT
Start: 2025-08-14

## 2025-07-03 RX ORDER — SODIUM CHLORIDE 0.9 % (FLUSH) 0.9 %
5-40 SYRINGE (ML) INJECTION PRN
OUTPATIENT
Start: 2025-08-14

## 2025-07-03 RX ORDER — ACETAMINOPHEN 325 MG/1
650 TABLET ORAL ONCE
OUTPATIENT
Start: 2025-08-14

## 2025-07-03 RX ADMIN — SODIUM CHLORIDE, PRESERVATIVE FREE 10 ML: 5 INJECTION INTRAVENOUS at 14:20

## 2025-07-03 RX ADMIN — SODIUM CHLORIDE, PRESERVATIVE FREE 10 ML: 5 INJECTION INTRAVENOUS at 13:10

## 2025-07-03 RX ADMIN — INFLIXIMAB 900 MG: 100 INJECTION, POWDER, LYOPHILIZED, FOR SOLUTION INTRAVENOUS at 13:14

## 2025-07-03 RX ADMIN — SODIUM CHLORIDE, PRESERVATIVE FREE 10 ML: 5 INJECTION INTRAVENOUS at 14:21

## 2025-07-03 NOTE — PROGRESS NOTES
Before infusion patient declined any infections, open wounds, or change in medical condition. Tolerated infusion well.  Reviewed therapy plan, offered education material and/or discharge material, reviewed medication information and signs and symptoms  and educated on possible side effects, verbalizes good knowledge of current plan patient verbalizes understanding, and has no signs or symptoms to report at this time. Patient discharged. Patient alert and oriented x3.   No distress noted.   Vital signs stable.   Patient denies any new or worsening pain.  Patient denies any needs.  All questions answered.  Next appointment scheduled. declines copy of AVS. Instructed on importance, patient did not stay 30 min observation after infusion completed. Patient verbalizes satisfied with treatment and verbalizes symptoms well controlled.

## 2025-08-13 ENCOUNTER — HOSPITAL ENCOUNTER (OUTPATIENT)
Dept: INFUSION THERAPY | Age: 70
Setting detail: INFUSION SERIES
Discharge: HOME | End: 2025-08-13
Payer: MEDICARE

## 2025-08-13 VITALS
RESPIRATION RATE: 16 BRPM | WEIGHT: 205 LBS | OXYGEN SATURATION: 96 % | TEMPERATURE: 97 F | SYSTOLIC BLOOD PRESSURE: 110 MMHG | HEART RATE: 94 BPM | BODY MASS INDEX: 37.73 KG/M2 | DIASTOLIC BLOOD PRESSURE: 56 MMHG | HEIGHT: 62 IN

## 2025-08-13 DIAGNOSIS — K50.113 CROHN'S DISEASE OF LARGE INTESTINE WITH FISTULA (HCC): Primary | ICD-10-CM

## 2025-08-13 PROCEDURE — 2500000003 HC RX 250 WO HCPCS: Performed by: INTERNAL MEDICINE

## 2025-08-13 PROCEDURE — 96413 CHEMO IV INFUSION 1 HR: CPT

## 2025-08-13 PROCEDURE — 6360000002 HC RX W HCPCS: Performed by: INTERNAL MEDICINE

## 2025-08-13 PROCEDURE — 2580000003 HC RX 258: Performed by: INTERNAL MEDICINE

## 2025-08-13 RX ORDER — HYDROCORTISONE SODIUM SUCCINATE 100 MG/2ML
100 INJECTION INTRAMUSCULAR; INTRAVENOUS
OUTPATIENT
Start: 2025-09-24

## 2025-08-13 RX ORDER — ACETAMINOPHEN 325 MG/1
650 TABLET ORAL
OUTPATIENT
Start: 2025-09-24

## 2025-08-13 RX ORDER — SODIUM CHLORIDE 0.9 % (FLUSH) 0.9 %
5-40 SYRINGE (ML) INJECTION PRN
OUTPATIENT
Start: 2025-09-24

## 2025-08-13 RX ORDER — EPINEPHRINE 1 MG/ML
0.3 INJECTION, SOLUTION, CONCENTRATE INTRAVENOUS PRN
OUTPATIENT
Start: 2025-09-24

## 2025-08-13 RX ORDER — SODIUM CHLORIDE 0.9 % (FLUSH) 0.9 %
5-40 SYRINGE (ML) INJECTION PRN
Status: DISCONTINUED | OUTPATIENT
Start: 2025-08-13 | End: 2025-08-14 | Stop reason: HOSPADM

## 2025-08-13 RX ORDER — ONDANSETRON 2 MG/ML
8 INJECTION INTRAMUSCULAR; INTRAVENOUS
OUTPATIENT
Start: 2025-09-24

## 2025-08-13 RX ORDER — ACETAMINOPHEN 325 MG/1
650 TABLET ORAL ONCE
OUTPATIENT
Start: 2025-09-24

## 2025-08-13 RX ORDER — ALBUTEROL SULFATE 90 UG/1
4 INHALANT RESPIRATORY (INHALATION) PRN
OUTPATIENT
Start: 2025-09-24

## 2025-08-13 RX ORDER — SODIUM CHLORIDE 9 MG/ML
5-250 INJECTION, SOLUTION INTRAVENOUS PRN
OUTPATIENT
Start: 2025-09-24

## 2025-08-13 RX ORDER — ACETAMINOPHEN 325 MG/1
650 TABLET ORAL ONCE
Status: DISCONTINUED | OUTPATIENT
Start: 2025-08-13 | End: 2025-08-14 | Stop reason: HOSPADM

## 2025-08-13 RX ORDER — SODIUM CHLORIDE 9 MG/ML
5-250 INJECTION, SOLUTION INTRAVENOUS PRN
Status: DISCONTINUED | OUTPATIENT
Start: 2025-08-13 | End: 2025-08-14 | Stop reason: HOSPADM

## 2025-08-13 RX ORDER — DIPHENHYDRAMINE HYDROCHLORIDE 50 MG/ML
50 INJECTION, SOLUTION INTRAMUSCULAR; INTRAVENOUS
OUTPATIENT
Start: 2025-09-24

## 2025-08-13 RX ORDER — SODIUM CHLORIDE 9 MG/ML
INJECTION, SOLUTION INTRAVENOUS CONTINUOUS
OUTPATIENT
Start: 2025-09-24

## 2025-08-13 RX ADMIN — INFLIXIMAB 900 MG: 100 INJECTION, POWDER, LYOPHILIZED, FOR SOLUTION INTRAVENOUS at 14:32

## 2025-08-13 RX ADMIN — SODIUM CHLORIDE, PRESERVATIVE FREE 10 ML: 5 INJECTION INTRAVENOUS at 15:37

## 2025-08-13 RX ADMIN — SODIUM CHLORIDE, PRESERVATIVE FREE 10 ML: 5 INJECTION INTRAVENOUS at 14:26

## 2025-08-13 RX ADMIN — SODIUM CHLORIDE, PRESERVATIVE FREE 10 ML: 5 INJECTION INTRAVENOUS at 15:38

## 2025-08-13 ASSESSMENT — PAIN SCALES - GENERAL: PAINLEVEL_OUTOF10: 0

## (undated) DEVICE — 4-PORT MANIFOLD: Brand: NEPTUNE 2

## (undated) DEVICE — SWABSTICK SURG PREP BENZOIN TINCTURE SINGLE ST

## (undated) DEVICE — SHEET, T, LAPAROTOMY, STERILE: Brand: MEDLINE

## (undated) DEVICE — READY WET SKIN SCRUB TRAY-LF: Brand: MEDLINE INDUSTRIES, INC.

## (undated) DEVICE — PACK PROCEDURE SURG GEN CUST

## (undated) DEVICE — GAUZE,SPONGE,4"X4",8PLY,STRL,LF,10/TRAY: Brand: MEDLINE

## (undated) DEVICE — COVER HNDL LT DISP

## (undated) DEVICE — Z DISCONTINUED NO SUB IDED DRAIN PENROSE L12IN 0.25IN USED TO PROMOTE DRNAGE IN OPN

## (undated) DEVICE — TAPE ADH W3INXL10YD WHT COT WVN BK POWERFUL RUB BASE HIGHLY

## (undated) DEVICE — STANDARD HYPODERMIC NEEDLE,ALUMINUM HUB: Brand: MONOJECT

## (undated) DEVICE — PATIENT RETURN ELECTRODE, SINGLE-USE, CONTACT QUALITY MONITORING, ADULT, WITH 9FT CORD, FOR PATIENTS WEIGING OVER 33LBS. (15KG): Brand: MEGADYNE